# Patient Record
Sex: FEMALE | Race: WHITE
[De-identification: names, ages, dates, MRNs, and addresses within clinical notes are randomized per-mention and may not be internally consistent; named-entity substitution may affect disease eponyms.]

---

## 2017-04-02 ENCOUNTER — HOSPITAL ENCOUNTER (EMERGENCY)
Dept: HOSPITAL 82 - ED | Age: 52
Discharge: HOME | DRG: 605 | End: 2017-04-02
Payer: COMMERCIAL

## 2017-04-02 VITALS — DIASTOLIC BLOOD PRESSURE: 78 MMHG | SYSTOLIC BLOOD PRESSURE: 140 MMHG

## 2017-04-02 VITALS — BODY MASS INDEX: 26.64 KG/M2 | HEIGHT: 63 IN | WEIGHT: 150.36 LBS

## 2017-04-02 DIAGNOSIS — S00.93XA: Primary | ICD-10-CM
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
## 2017-04-02 DIAGNOSIS — S83.91XA: ICD-10-CM
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
## 2017-04-02 DIAGNOSIS — S10.93XA: ICD-10-CM

## 2017-04-11 ENCOUNTER — HOSPITAL ENCOUNTER (EMERGENCY)
Dept: HOSPITAL 82 - ED | Age: 52
Discharge: HOME | DRG: 950 | End: 2017-04-11
Payer: COMMERCIAL

## 2017-04-11 VITALS — WEIGHT: 150.31 LBS | BODY MASS INDEX: 26.63 KG/M2 | HEIGHT: 63 IN

## 2017-04-11 VITALS — DIASTOLIC BLOOD PRESSURE: 92 MMHG | SYSTOLIC BLOOD PRESSURE: 129 MMHG

## 2017-04-11 DIAGNOSIS — S80.01XD: Primary | ICD-10-CM

## 2017-04-11 DIAGNOSIS — S80.11XD: ICD-10-CM

## 2017-04-11 DIAGNOSIS — S83.91XD: ICD-10-CM
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
## 2017-04-11 DIAGNOSIS — M25.461: ICD-10-CM

## 2017-08-22 ENCOUNTER — HOSPITAL ENCOUNTER (EMERGENCY)
Dept: HOSPITAL 82 - ED | Age: 52
Discharge: HOME | DRG: 552 | End: 2017-08-22
Payer: COMMERCIAL

## 2017-08-22 VITALS — WEIGHT: 160.28 LBS | HEIGHT: 63 IN | BODY MASS INDEX: 28.4 KG/M2

## 2017-08-22 VITALS — DIASTOLIC BLOOD PRESSURE: 72 MMHG | SYSTOLIC BLOOD PRESSURE: 138 MMHG

## 2017-08-22 DIAGNOSIS — G35: ICD-10-CM
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
## 2017-08-22 DIAGNOSIS — F31.9: ICD-10-CM

## 2017-08-22 DIAGNOSIS — S13.4XXA: Primary | ICD-10-CM

## 2017-08-22 DIAGNOSIS — F41.9: ICD-10-CM

## 2017-08-22 DIAGNOSIS — J44.9: ICD-10-CM
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
## 2017-08-22 DIAGNOSIS — I10: ICD-10-CM

## 2017-09-12 ENCOUNTER — HOSPITAL ENCOUNTER (INPATIENT)
Dept: HOSPITAL 82 - ED | Age: 52
LOS: 10 days | Discharge: NURSING, REHAB OR LONG-TERM CARE | DRG: 433 | End: 2017-09-22
Attending: INTERNAL MEDICINE | Admitting: INTERNAL MEDICINE
Payer: COMMERCIAL

## 2017-09-12 VITALS — SYSTOLIC BLOOD PRESSURE: 134 MMHG | DIASTOLIC BLOOD PRESSURE: 84 MMHG

## 2017-09-12 VITALS — BODY MASS INDEX: 32.97 KG/M2 | HEIGHT: 63 IN | WEIGHT: 186.06 LBS

## 2017-09-12 VITALS — DIASTOLIC BLOOD PRESSURE: 67 MMHG | SYSTOLIC BLOOD PRESSURE: 111 MMHG

## 2017-09-12 VITALS — DIASTOLIC BLOOD PRESSURE: 76 MMHG | SYSTOLIC BLOOD PRESSURE: 123 MMHG

## 2017-09-12 DIAGNOSIS — N39.0: ICD-10-CM

## 2017-09-12 DIAGNOSIS — K29.70: ICD-10-CM

## 2017-09-12 DIAGNOSIS — Z92.3: ICD-10-CM

## 2017-09-12 DIAGNOSIS — E78.5: ICD-10-CM

## 2017-09-12 DIAGNOSIS — F10.10: ICD-10-CM

## 2017-09-12 DIAGNOSIS — B96.20: ICD-10-CM

## 2017-09-12 DIAGNOSIS — K27.9: ICD-10-CM

## 2017-09-12 DIAGNOSIS — F31.9: ICD-10-CM

## 2017-09-12 DIAGNOSIS — I10: ICD-10-CM

## 2017-09-12 DIAGNOSIS — M79.7: ICD-10-CM

## 2017-09-12 DIAGNOSIS — M19.90: ICD-10-CM

## 2017-09-12 DIAGNOSIS — D05.11: ICD-10-CM

## 2017-09-12 DIAGNOSIS — G62.9: ICD-10-CM

## 2017-09-12 DIAGNOSIS — K21.9: ICD-10-CM

## 2017-09-12 DIAGNOSIS — J44.9: ICD-10-CM

## 2017-09-12 DIAGNOSIS — K70.31: ICD-10-CM

## 2017-09-12 DIAGNOSIS — K70.11: Primary | ICD-10-CM

## 2017-09-12 DIAGNOSIS — Z91.19: ICD-10-CM

## 2017-09-12 LAB
ALBUMIN SERPL-MCNC: 3.5 G/DL (ref 3.2–5)
ALP SERPL-CCNC: 228 U/L (ref 38–126)
ALT SERPL-CCNC: 48 U/L (ref 9–52)
AMYLASE SERPL-CCNC: 49 U/L (ref 30–110)
ANION GAP SERPL CALCULATED.3IONS-SCNC: 20 MMOL/L
APTT PPP: 30.8 SECONDS (ref 20–32.5)
AST SERPL-CCNC: 172 U/L (ref 14–36)
BASOPHILS NFR BLD AUTO: 1 % (ref 0–3)
BUN SERPL-MCNC: 5 MG/DL (ref 7–17)
BUN/CREAT SERPL: 6
CALCIUM SERPL-MCNC: 7.6 MG/DL (ref 8.4–10.2)
CHLORIDE SERPL-SCNC: 95 MMOL/L (ref 95–108)
CO2 SERPL-SCNC: 25 MMOL/L (ref 22–30)
CREAT SERPL-MCNC: 0.8 MG/DL (ref 0.5–1)
EOSINOPHIL NFR BLD AUTO: 1 % (ref 0–8)
ERYTHROCYTE [DISTWIDTH] IN BLOOD BY AUTOMATED COUNT: 24.5 % (ref 11.5–15.5)
GLUCOSE SERPL-MCNC: 90 MG/DL (ref 65–105)
HCT VFR BLD AUTO: 30.5 % (ref 37–47)
HGB BLD-MCNC: 10.3 G/DL (ref 12–16)
IMM GRANULOCYTES NFR BLD: 0.2 % (ref 0–1)
INR PPP: 1.4 RATIO (ref 0.7–1.3)
LIPASE SERPL-CCNC: 124 U/L (ref 23–300)
LYMPHOCYTES NFR BLD: 12 % (ref 15–41)
MCH RBC QN AUTO: 31.4 PG  CALC (ref 26–32)
MCHC RBC AUTO-ENTMCNC: 33.8 G/L CALC (ref 32–36)
MCV RBC AUTO: 93 FL  CALC (ref 80–100)
MONOCYTES NFR BLD AUTO: 9 % (ref 2–13)
MYOGLOBIN SERPL-MCNC: 40 NG/ML (ref 0–62)
NEUTROPHILS # BLD AUTO: 8.02 THOU/UL (ref 2–7.15)
NEUTROPHILS NFR BLD AUTO: 78 % (ref 42–76)
PLATELET # BLD AUTO: 152 THOU/UL (ref 130–400)
POTASSIUM SERPL-SCNC: 2.6 MMOL/L (ref 3.5–5.1)
PROT SERPL-MCNC: 7.6 G/DL (ref 6.3–8.2)
PROTHROMBIN TIME: 15.1 SECONDS (ref 9–12.5)
RBC # BLD AUTO: 3.28 MILL/UL (ref 4.2–5.6)
SODIUM SERPL-SCNC: 137 MMOL/L (ref 137–146)

## 2017-09-12 PROCEDURE — S0164 INJECTION PANTROPRAZOLE: HCPCS

## 2017-09-12 PROCEDURE — P9016 RBC LEUKOCYTES REDUCED: HCPCS

## 2017-09-12 PROCEDURE — 0W9G3ZX DRAINAGE OF PERITONEAL CAVITY, PERCUTANEOUS APPROACH, DIAGNOSTIC: ICD-10-PCS | Performed by: RADIOLOGY

## 2017-09-13 VITALS — DIASTOLIC BLOOD PRESSURE: 85 MMHG | SYSTOLIC BLOOD PRESSURE: 145 MMHG

## 2017-09-13 VITALS — SYSTOLIC BLOOD PRESSURE: 96 MMHG | DIASTOLIC BLOOD PRESSURE: 56 MMHG

## 2017-09-13 VITALS — SYSTOLIC BLOOD PRESSURE: 128 MMHG | DIASTOLIC BLOOD PRESSURE: 84 MMHG

## 2017-09-13 VITALS — DIASTOLIC BLOOD PRESSURE: 75 MMHG | SYSTOLIC BLOOD PRESSURE: 128 MMHG

## 2017-09-13 VITALS — DIASTOLIC BLOOD PRESSURE: 70 MMHG | SYSTOLIC BLOOD PRESSURE: 109 MMHG

## 2017-09-13 LAB
ALBUMIN SERPL-MCNC: 2.9 G/DL (ref 3.2–5)
ALP SERPL-CCNC: 179 U/L (ref 38–126)
ALT SERPL-CCNC: 44 U/L (ref 9–52)
ANION GAP SERPL CALCULATED.3IONS-SCNC: 14 MMOL/L
APTT PPP: 31.4 SECONDS (ref 20–32.5)
AST SERPL-CCNC: 141 U/L (ref 14–36)
BASOPHILS NFR BLD AUTO: 1 % (ref 0–3)
BUN SERPL-MCNC: 6 MG/DL (ref 7–17)
BUN/CREAT SERPL: 8
CALCIUM SERPL-MCNC: 6.7 MG/DL (ref 8.4–10.2)
CHLORIDE SERPL-SCNC: 100 MMOL/L (ref 95–108)
CO2 SERPL-SCNC: 25 MMOL/L (ref 22–30)
CREAT SERPL-MCNC: 0.8 MG/DL (ref 0.5–1)
EOSINOPHIL NFR BLD AUTO: 1 % (ref 0–8)
ERYTHROCYTE [DISTWIDTH] IN BLOOD BY AUTOMATED COUNT: 24.4 % (ref 11.5–15.5)
GLUCOSE SERPL-MCNC: 113 MG/DL (ref 65–105)
HCT VFR BLD AUTO: 27.1 % (ref 37–47)
HGB BLD-MCNC: 9 G/DL (ref 12–16)
IMM GRANULOCYTES NFR BLD: 0.1 % (ref 0–1)
INR PPP: 1.4 RATIO (ref 0.7–1.3)
INR PPP: 1.4 RATIO (ref 0.7–1.3)
LYMPHOCYTES NFR BLD: 20 % (ref 15–41)
MCH RBC QN AUTO: 31.3 PG  CALC (ref 26–32)
MCHC RBC AUTO-ENTMCNC: 33.2 G/L CALC (ref 32–36)
MCV RBC AUTO: 94.1 FL  CALC (ref 80–100)
MONOCYTES NFR BLD AUTO: 11 % (ref 2–13)
NEUTROPHILS # BLD AUTO: 4.75 THOU/UL (ref 2–7.15)
NEUTROPHILS NFR BLD AUTO: 67 % (ref 42–76)
PLATELET # BLD AUTO: 130 THOU/UL (ref 130–400)
POTASSIUM SERPL-SCNC: 3 MMOL/L (ref 3.5–5.1)
PROT SERPL-MCNC: 6.6 G/DL (ref 6.3–8.2)
PROTHROMBIN TIME: 15.1 SECONDS (ref 9–12.5)
PROTHROMBIN TIME: 15.4 SECONDS (ref 9–12.5)
RBC # BLD AUTO: 2.88 MILL/UL (ref 4.2–5.6)
SODIUM SERPL-SCNC: 136 MMOL/L (ref 137–146)

## 2017-09-14 VITALS — SYSTOLIC BLOOD PRESSURE: 136 MMHG | DIASTOLIC BLOOD PRESSURE: 84 MMHG

## 2017-09-14 VITALS — DIASTOLIC BLOOD PRESSURE: 68 MMHG | SYSTOLIC BLOOD PRESSURE: 100 MMHG

## 2017-09-14 VITALS — DIASTOLIC BLOOD PRESSURE: 80 MMHG | SYSTOLIC BLOOD PRESSURE: 114 MMHG

## 2017-09-14 VITALS — SYSTOLIC BLOOD PRESSURE: 138 MMHG | DIASTOLIC BLOOD PRESSURE: 84 MMHG

## 2017-09-14 VITALS — SYSTOLIC BLOOD PRESSURE: 117 MMHG | DIASTOLIC BLOOD PRESSURE: 73 MMHG

## 2017-09-14 VITALS — DIASTOLIC BLOOD PRESSURE: 87 MMHG | SYSTOLIC BLOOD PRESSURE: 129 MMHG

## 2017-09-14 VITALS — SYSTOLIC BLOOD PRESSURE: 106 MMHG | DIASTOLIC BLOOD PRESSURE: 66 MMHG

## 2017-09-14 VITALS — DIASTOLIC BLOOD PRESSURE: 55 MMHG | SYSTOLIC BLOOD PRESSURE: 107 MMHG

## 2017-09-14 VITALS — DIASTOLIC BLOOD PRESSURE: 82 MMHG | SYSTOLIC BLOOD PRESSURE: 135 MMHG

## 2017-09-14 VITALS — DIASTOLIC BLOOD PRESSURE: 69 MMHG | SYSTOLIC BLOOD PRESSURE: 123 MMHG

## 2017-09-14 LAB
ALBUMIN SERPL-MCNC: 2.8 G/DL (ref 3.2–5)
ALP SERPL-CCNC: 186 U/L (ref 38–126)
ALT SERPL-CCNC: 47 U/L (ref 9–52)
ANION GAP SERPL CALCULATED.3IONS-SCNC: 13 MMOL/L
AST SERPL-CCNC: 141 U/L (ref 14–36)
BASOPHILS NFR BLD AUTO: 0 % (ref 0–3)
BUN SERPL-MCNC: 4 MG/DL (ref 7–17)
BUN/CREAT SERPL: 6
CALCIUM SERPL-MCNC: 6.8 MG/DL (ref 8.4–10.2)
CHLORIDE SERPL-SCNC: 103 MMOL/L (ref 95–108)
CO2 SERPL-SCNC: 26 MMOL/L (ref 22–30)
CREAT SERPL-MCNC: 0.6 MG/DL (ref 0.5–1)
EOSINOPHIL NFR BLD AUTO: 1 % (ref 0–8)
ERYTHROCYTE [DISTWIDTH] IN BLOOD BY AUTOMATED COUNT: 24.1 % (ref 11.5–15.5)
GLUCOSE SERPL-MCNC: 102 MG/DL (ref 65–105)
HCT VFR BLD AUTO: 27.3 % (ref 37–47)
HGB BLD-MCNC: 8.9 G/DL (ref 12–16)
IMM GRANULOCYTES NFR BLD: 0.4 % (ref 0–1)
LYMPHOCYTES NFR BLD: 20 % (ref 15–41)
MCH RBC QN AUTO: 30.9 PG  CALC (ref 26–32)
MCHC RBC AUTO-ENTMCNC: 32.6 G/L CALC (ref 32–36)
MCV RBC AUTO: 94.8 FL  CALC (ref 80–100)
MONOCYTES NFR BLD AUTO: 14 % (ref 2–13)
NEUTROPHILS # BLD AUTO: 4.66 THOU/UL (ref 2–7.15)
NEUTROPHILS NFR BLD AUTO: 65 % (ref 42–76)
PLATELET # BLD AUTO: 132 THOU/UL (ref 130–400)
POTASSIUM SERPL-SCNC: 3.9 MMOL/L (ref 3.5–5.1)
PROT SERPL-MCNC: 6.5 G/DL (ref 6.3–8.2)
RBC # BLD AUTO: 2.88 MILL/UL (ref 4.2–5.6)
SODIUM SERPL-SCNC: 138 MMOL/L (ref 137–146)

## 2017-09-14 PROCEDURE — 30233N1 TRANSFUSION OF NONAUTOLOGOUS RED BLOOD CELLS INTO PERIPHERAL VEIN, PERCUTANEOUS APPROACH: ICD-10-PCS | Performed by: RADIOLOGY

## 2017-09-14 PROCEDURE — 30233N1 TRANSFUSION OF NONAUTOLOGOUS RED BLOOD CELLS INTO PERIPHERAL VEIN, PERCUTANEOUS APPROACH: ICD-10-PCS | Performed by: INTERNAL MEDICINE

## 2017-09-15 VITALS — SYSTOLIC BLOOD PRESSURE: 124 MMHG | DIASTOLIC BLOOD PRESSURE: 86 MMHG

## 2017-09-15 VITALS — DIASTOLIC BLOOD PRESSURE: 73 MMHG | SYSTOLIC BLOOD PRESSURE: 122 MMHG

## 2017-09-15 VITALS — SYSTOLIC BLOOD PRESSURE: 127 MMHG | DIASTOLIC BLOOD PRESSURE: 78 MMHG

## 2017-09-15 VITALS — SYSTOLIC BLOOD PRESSURE: 125 MMHG | DIASTOLIC BLOOD PRESSURE: 65 MMHG

## 2017-09-15 LAB
ALBUMIN SERPL-MCNC: 2.8 G/DL (ref 3.2–5)
ALP SERPL-CCNC: 186 U/L (ref 38–126)
ALT SERPL-CCNC: 43 U/L (ref 9–52)
AMORPH SED URNS QL MICRO: (no result) HPF
ANION GAP SERPL CALCULATED.3IONS-SCNC: 14 MMOL/L
AST SERPL-CCNC: 133 U/L (ref 14–36)
BACTERIA #/AREA URNS HPF: (no result) HPF
BASOPHILS NFR BLD AUTO: 1 % (ref 0–3)
BILIRUB UR QL STRIP.AUTO: (no result)
BUN SERPL-MCNC: 4 MG/DL (ref 7–17)
BUN/CREAT SERPL: 6
CALCIUM SERPL-MCNC: 6.8 MG/DL (ref 8.4–10.2)
CHLORIDE SERPL-SCNC: 105 MMOL/L (ref 95–108)
CLARITY UR: (no result)
CO2 SERPL-SCNC: 25 MMOL/L (ref 22–30)
COLOR UR AUTO: YELLOW
CREAT SERPL-MCNC: 0.6 MG/DL (ref 0.5–1)
EOSINOPHIL NFR BLD AUTO: 1 % (ref 0–8)
ERYTHROCYTE [DISTWIDTH] IN BLOOD BY AUTOMATED COUNT: 23.2 % (ref 11.5–15.5)
GLUCOSE SERPL-MCNC: 80 MG/DL (ref 65–105)
GLUCOSE UR STRIP.AUTO-MCNC: 100 MG/DL
HCT VFR BLD AUTO: 35.6 % (ref 37–47)
HGB BLD-MCNC: 12 G/DL (ref 12–16)
HGB UR QL STRIP.AUTO: (no result)
IMM GRANULOCYTES NFR BLD: 0.1 % (ref 0–1)
KETONES UR STRIP.AUTO-MCNC: (no result) MG/DL
LEUKOCYTE ESTERASE UR QL STRIP.AUTO: (no result)
LYMPHOCYTES NFR BLD: 25 % (ref 15–41)
MCH RBC QN AUTO: 30.4 PG  CALC (ref 26–32)
MCHC RBC AUTO-ENTMCNC: 33.7 G/L CALC (ref 32–36)
MCV RBC AUTO: 90.1 FL  CALC (ref 80–100)
MONOCYTES NFR BLD AUTO: 12 % (ref 2–13)
NEUTROPHILS # BLD AUTO: 4.61 THOU/UL (ref 2–7.15)
NEUTROPHILS NFR BLD AUTO: 62 % (ref 42–76)
NITRITE UR QL STRIP.AUTO: POSITIVE
PH UR STRIP.AUTO: 5 [PH] (ref 4.5–8)
PLATELET # BLD AUTO: 140 THOU/UL (ref 130–400)
POTASSIUM SERPL-SCNC: 3.5 MMOL/L (ref 3.5–5.1)
PROT SERPL-MCNC: 6.7 G/DL (ref 6.3–8.2)
PROT UR QL STRIP.AUTO: 100 MG/DL
RBC # BLD AUTO: 3.95 MILL/UL (ref 4.2–5.6)
RBC #/AREA URNS HPF: (no result) RBC/HPF (ref 0–5)
SERVICE CMNT 15-IMP: (no result) HPF
SODIUM SERPL-SCNC: 139 MMOL/L (ref 137–146)
SP GR UR STRIP.AUTO: >=1.03
SQUAMOUS URNS QL MICRO: (no result) EPI/HPF
UROBILINOGEN UR QL STRIP.AUTO: 0.2 E.U./DL

## 2017-09-16 VITALS — SYSTOLIC BLOOD PRESSURE: 129 MMHG | DIASTOLIC BLOOD PRESSURE: 79 MMHG

## 2017-09-16 VITALS — SYSTOLIC BLOOD PRESSURE: 118 MMHG | DIASTOLIC BLOOD PRESSURE: 65 MMHG

## 2017-09-16 VITALS — DIASTOLIC BLOOD PRESSURE: 85 MMHG | SYSTOLIC BLOOD PRESSURE: 120 MMHG

## 2017-09-16 VITALS — DIASTOLIC BLOOD PRESSURE: 78 MMHG | SYSTOLIC BLOOD PRESSURE: 134 MMHG

## 2017-09-16 VITALS — SYSTOLIC BLOOD PRESSURE: 130 MMHG | DIASTOLIC BLOOD PRESSURE: 88 MMHG

## 2017-09-16 VITALS — SYSTOLIC BLOOD PRESSURE: 130 MMHG | DIASTOLIC BLOOD PRESSURE: 82 MMHG

## 2017-09-16 VITALS — SYSTOLIC BLOOD PRESSURE: 121 MMHG | DIASTOLIC BLOOD PRESSURE: 81 MMHG

## 2017-09-16 LAB
ALBUMIN SERPL-MCNC: 3.1 G/DL (ref 3.2–5)
ALP SERPL-CCNC: 195 U/L (ref 38–126)
ALT SERPL-CCNC: 42 U/L (ref 9–52)
ANION GAP SERPL CALCULATED.3IONS-SCNC: 14 MMOL/L
AST SERPL-CCNC: 135 U/L (ref 14–36)
BASOPHILS NFR BLD AUTO: 1 % (ref 0–3)
BUN SERPL-MCNC: 5 MG/DL (ref 7–17)
BUN/CREAT SERPL: 9
CALCIUM SERPL-MCNC: 7.1 MG/DL (ref 8.4–10.2)
CHLORIDE SERPL-SCNC: 103 MMOL/L (ref 95–108)
CO2 SERPL-SCNC: 26 MMOL/L (ref 22–30)
CREAT SERPL-MCNC: 0.6 MG/DL (ref 0.5–1)
EOSINOPHIL NFR BLD AUTO: 1 % (ref 0–8)
ERYTHROCYTE [DISTWIDTH] IN BLOOD BY AUTOMATED COUNT: 23.1 % (ref 11.5–15.5)
GLUCOSE SERPL-MCNC: 84 MG/DL (ref 65–105)
HCT VFR BLD AUTO: 37.1 % (ref 37–47)
HGB BLD-MCNC: 12.5 G/DL (ref 12–16)
IMM GRANULOCYTES NFR BLD: 0.2 % (ref 0–1)
LYMPHOCYTES NFR BLD: 21 % (ref 15–41)
MCH RBC QN AUTO: 30.6 PG  CALC (ref 26–32)
MCHC RBC AUTO-ENTMCNC: 33.7 G/L CALC (ref 32–36)
MCV RBC AUTO: 90.9 FL  CALC (ref 80–100)
MONOCYTES NFR BLD AUTO: 12 % (ref 2–13)
NEUTROPHILS # BLD AUTO: 5.19 THOU/UL (ref 2–7.15)
NEUTROPHILS NFR BLD AUTO: 65 % (ref 42–76)
PLATELET # BLD AUTO: 173 THOU/UL (ref 130–400)
POTASSIUM SERPL-SCNC: 3.7 MMOL/L (ref 3.5–5.1)
PROT SERPL-MCNC: 7.1 G/DL (ref 6.3–8.2)
RBC # BLD AUTO: 4.08 MILL/UL (ref 4.2–5.6)
SODIUM SERPL-SCNC: 139 MMOL/L (ref 137–146)

## 2017-09-17 VITALS — SYSTOLIC BLOOD PRESSURE: 127 MMHG | DIASTOLIC BLOOD PRESSURE: 80 MMHG

## 2017-09-17 VITALS — DIASTOLIC BLOOD PRESSURE: 80 MMHG | SYSTOLIC BLOOD PRESSURE: 168 MMHG

## 2017-09-17 VITALS — DIASTOLIC BLOOD PRESSURE: 71 MMHG | SYSTOLIC BLOOD PRESSURE: 110 MMHG

## 2017-09-17 VITALS — SYSTOLIC BLOOD PRESSURE: 129 MMHG | DIASTOLIC BLOOD PRESSURE: 80 MMHG

## 2017-09-17 VITALS — SYSTOLIC BLOOD PRESSURE: 144 MMHG | DIASTOLIC BLOOD PRESSURE: 80 MMHG

## 2017-09-17 VITALS — DIASTOLIC BLOOD PRESSURE: 62 MMHG | SYSTOLIC BLOOD PRESSURE: 97 MMHG

## 2017-09-17 VITALS — DIASTOLIC BLOOD PRESSURE: 58 MMHG | SYSTOLIC BLOOD PRESSURE: 91 MMHG

## 2017-09-17 LAB
ANION GAP SERPL CALCULATED.3IONS-SCNC: 13 MMOL/L
BASOPHILS NFR BLD AUTO: 1 % (ref 0–3)
BUN SERPL-MCNC: 6 MG/DL (ref 7–17)
BUN/CREAT SERPL: 10
CALCIUM SERPL-MCNC: 6.9 MG/DL (ref 8.4–10.2)
CHLORIDE SERPL-SCNC: 103 MMOL/L (ref 95–108)
CO2 SERPL-SCNC: 25 MMOL/L (ref 22–30)
CREAT SERPL-MCNC: 0.5 MG/DL (ref 0.5–1)
EOSINOPHIL NFR BLD AUTO: 1 % (ref 0–8)
ERYTHROCYTE [DISTWIDTH] IN BLOOD BY AUTOMATED COUNT: 22.4 % (ref 11.5–15.5)
GLUCOSE SERPL-MCNC: 92 MG/DL (ref 65–105)
HCT VFR BLD AUTO: 33.9 % (ref 37–47)
HGB BLD-MCNC: 11.4 G/DL (ref 12–16)
IMM GRANULOCYTES NFR BLD: 0.3 % (ref 0–1)
LYMPHOCYTES NFR BLD: 22 % (ref 15–41)
MCH RBC QN AUTO: 31 PG  CALC (ref 26–32)
MCHC RBC AUTO-ENTMCNC: 33.6 G/L CALC (ref 32–36)
MCV RBC AUTO: 92.1 FL  CALC (ref 80–100)
MONOCYTES NFR BLD AUTO: 12 % (ref 2–13)
NEUTROPHILS # BLD AUTO: 4.94 THOU/UL (ref 2–7.15)
NEUTROPHILS NFR BLD AUTO: 64 % (ref 42–76)
PLATELET # BLD AUTO: 148 THOU/UL (ref 130–400)
POTASSIUM SERPL-SCNC: 3 MMOL/L (ref 3.5–5.1)
RBC # BLD AUTO: 3.68 MILL/UL (ref 4.2–5.6)
SODIUM SERPL-SCNC: 139 MMOL/L (ref 137–146)

## 2017-09-18 VITALS — DIASTOLIC BLOOD PRESSURE: 85 MMHG | SYSTOLIC BLOOD PRESSURE: 121 MMHG

## 2017-09-18 VITALS — SYSTOLIC BLOOD PRESSURE: 109 MMHG | DIASTOLIC BLOOD PRESSURE: 69 MMHG

## 2017-09-18 VITALS — DIASTOLIC BLOOD PRESSURE: 78 MMHG | SYSTOLIC BLOOD PRESSURE: 113 MMHG

## 2017-09-18 VITALS — SYSTOLIC BLOOD PRESSURE: 118 MMHG | DIASTOLIC BLOOD PRESSURE: 75 MMHG

## 2017-09-18 VITALS — SYSTOLIC BLOOD PRESSURE: 109 MMHG | DIASTOLIC BLOOD PRESSURE: 67 MMHG

## 2017-09-18 VITALS — SYSTOLIC BLOOD PRESSURE: 128 MMHG | DIASTOLIC BLOOD PRESSURE: 80 MMHG

## 2017-09-18 LAB
ALBUMIN SERPL-MCNC: 2.6 G/DL (ref 3.2–5)
ALP SERPL-CCNC: 153 U/L (ref 38–126)
ALT SERPL-CCNC: 38 U/L (ref 9–52)
ANION GAP SERPL CALCULATED.3IONS-SCNC: 11 MMOL/L
AST SERPL-CCNC: 101 U/L (ref 14–36)
BASOPHILS NFR BLD AUTO: 1 % (ref 0–3)
BUN SERPL-MCNC: 6 MG/DL (ref 7–17)
BUN/CREAT SERPL: 12
CALCIUM SERPL-MCNC: 7.3 MG/DL (ref 8.4–10.2)
CHLORIDE SERPL-SCNC: 105 MMOL/L (ref 95–108)
CO2 SERPL-SCNC: 25 MMOL/L (ref 22–30)
CREAT SERPL-MCNC: 0.5 MG/DL (ref 0.5–1)
EOSINOPHIL NFR BLD AUTO: 1 % (ref 0–8)
ERYTHROCYTE [DISTWIDTH] IN BLOOD BY AUTOMATED COUNT: 21.5 % (ref 11.5–15.5)
GLUCOSE SERPL-MCNC: 92 MG/DL (ref 65–105)
HCT VFR BLD AUTO: 34.3 % (ref 37–47)
HGB BLD-MCNC: 11.5 G/DL (ref 12–16)
IMM GRANULOCYTES NFR BLD: 0.4 % (ref 0–1)
LYMPHOCYTES NFR BLD: 23 % (ref 15–41)
MCH RBC QN AUTO: 31 PG  CALC (ref 26–32)
MCHC RBC AUTO-ENTMCNC: 33.5 G/L CALC (ref 32–36)
MCV RBC AUTO: 92.5 FL  CALC (ref 80–100)
MONOCYTES NFR BLD AUTO: 12 % (ref 2–13)
NEUTROPHILS # BLD AUTO: 5.14 THOU/UL (ref 2–7.15)
NEUTROPHILS NFR BLD AUTO: 63 % (ref 42–76)
PLATELET # BLD AUTO: 156 THOU/UL (ref 130–400)
POTASSIUM SERPL-SCNC: 3.6 MMOL/L (ref 3.5–5.1)
PROT SERPL-MCNC: 6.3 G/DL (ref 6.3–8.2)
RBC # BLD AUTO: 3.71 MILL/UL (ref 4.2–5.6)
SODIUM SERPL-SCNC: 138 MMOL/L (ref 137–146)

## 2017-09-18 PROCEDURE — 02HV33Z INSERTION OF INFUSION DEVICE INTO SUPERIOR VENA CAVA, PERCUTANEOUS APPROACH: ICD-10-PCS | Performed by: RADIOLOGY

## 2017-09-18 PROCEDURE — B518ZZA FLUOROSCOPY OF SUPERIOR VENA CAVA, GUIDANCE: ICD-10-PCS | Performed by: RADIOLOGY

## 2017-09-19 VITALS — SYSTOLIC BLOOD PRESSURE: 122 MMHG | DIASTOLIC BLOOD PRESSURE: 89 MMHG

## 2017-09-19 VITALS — SYSTOLIC BLOOD PRESSURE: 147 MMHG | DIASTOLIC BLOOD PRESSURE: 80 MMHG

## 2017-09-19 VITALS — DIASTOLIC BLOOD PRESSURE: 70 MMHG | SYSTOLIC BLOOD PRESSURE: 146 MMHG

## 2017-09-19 VITALS — SYSTOLIC BLOOD PRESSURE: 123 MMHG | DIASTOLIC BLOOD PRESSURE: 80 MMHG

## 2017-09-19 VITALS — SYSTOLIC BLOOD PRESSURE: 119 MMHG | DIASTOLIC BLOOD PRESSURE: 83 MMHG

## 2017-09-19 VITALS — SYSTOLIC BLOOD PRESSURE: 123 MMHG | DIASTOLIC BLOOD PRESSURE: 77 MMHG

## 2017-09-20 VITALS — SYSTOLIC BLOOD PRESSURE: 138 MMHG | DIASTOLIC BLOOD PRESSURE: 82 MMHG

## 2017-09-20 VITALS — DIASTOLIC BLOOD PRESSURE: 75 MMHG | SYSTOLIC BLOOD PRESSURE: 119 MMHG

## 2017-09-20 VITALS — SYSTOLIC BLOOD PRESSURE: 124 MMHG | DIASTOLIC BLOOD PRESSURE: 74 MMHG

## 2017-09-20 VITALS — DIASTOLIC BLOOD PRESSURE: 81 MMHG | SYSTOLIC BLOOD PRESSURE: 176 MMHG

## 2017-09-20 VITALS — SYSTOLIC BLOOD PRESSURE: 141 MMHG | DIASTOLIC BLOOD PRESSURE: 71 MMHG

## 2017-09-20 VITALS — SYSTOLIC BLOOD PRESSURE: 123 MMHG | DIASTOLIC BLOOD PRESSURE: 77 MMHG

## 2017-09-21 VITALS — DIASTOLIC BLOOD PRESSURE: 82 MMHG | SYSTOLIC BLOOD PRESSURE: 139 MMHG

## 2017-09-21 VITALS — DIASTOLIC BLOOD PRESSURE: 97 MMHG | SYSTOLIC BLOOD PRESSURE: 152 MMHG

## 2017-09-21 VITALS — SYSTOLIC BLOOD PRESSURE: 105 MMHG | DIASTOLIC BLOOD PRESSURE: 70 MMHG

## 2017-09-21 VITALS — DIASTOLIC BLOOD PRESSURE: 65 MMHG | SYSTOLIC BLOOD PRESSURE: 109 MMHG

## 2017-09-21 VITALS — SYSTOLIC BLOOD PRESSURE: 134 MMHG | DIASTOLIC BLOOD PRESSURE: 88 MMHG

## 2017-09-21 LAB
ANION GAP SERPL CALCULATED.3IONS-SCNC: 15 MMOL/L
BASOPHILS NFR BLD AUTO: 1 % (ref 0–3)
BUN SERPL-MCNC: 6 MG/DL (ref 7–17)
BUN/CREAT SERPL: 11
CALCIUM SERPL-MCNC: 8.3 MG/DL (ref 8.4–10.2)
CHLORIDE SERPL-SCNC: 103 MMOL/L (ref 95–108)
CO2 SERPL-SCNC: 23 MMOL/L (ref 22–30)
CREAT SERPL-MCNC: 0.6 MG/DL (ref 0.5–1)
EOSINOPHIL NFR BLD AUTO: 1 % (ref 0–8)
ERYTHROCYTE [DISTWIDTH] IN BLOOD BY AUTOMATED COUNT: 20.8 % (ref 11.5–15.5)
GLUCOSE SERPL-MCNC: 84 MG/DL (ref 65–105)
HCT VFR BLD AUTO: 36 % (ref 37–47)
HGB BLD-MCNC: 12.2 G/DL (ref 12–16)
IMM GRANULOCYTES NFR BLD: 0.3 % (ref 0–1)
LYMPHOCYTES NFR BLD: 23 % (ref 15–41)
MCH RBC QN AUTO: 31.1 PG  CALC (ref 26–32)
MCHC RBC AUTO-ENTMCNC: 33.9 G/L CALC (ref 32–36)
MCV RBC AUTO: 91.8 FL  CALC (ref 80–100)
MONOCYTES NFR BLD AUTO: 9 % (ref 2–13)
NEUTROPHILS # BLD AUTO: 7.59 THOU/UL (ref 2–7.15)
NEUTROPHILS NFR BLD AUTO: 66 % (ref 42–76)
PLATELET # BLD AUTO: 166 THOU/UL (ref 130–400)
POTASSIUM SERPL-SCNC: 3.8 MMOL/L (ref 3.5–5.1)
RBC # BLD AUTO: 3.92 MILL/UL (ref 4.2–5.6)
SODIUM SERPL-SCNC: 138 MMOL/L (ref 137–146)

## 2017-09-22 VITALS — DIASTOLIC BLOOD PRESSURE: 77 MMHG | SYSTOLIC BLOOD PRESSURE: 111 MMHG

## 2017-09-22 VITALS — DIASTOLIC BLOOD PRESSURE: 85 MMHG | SYSTOLIC BLOOD PRESSURE: 120 MMHG

## 2017-09-22 VITALS — SYSTOLIC BLOOD PRESSURE: 119 MMHG | DIASTOLIC BLOOD PRESSURE: 82 MMHG

## 2017-09-24 ENCOUNTER — HOSPITAL ENCOUNTER (INPATIENT)
Dept: HOSPITAL 82 - ED | Age: 52
LOS: 3 days | Discharge: HOME | DRG: 434 | End: 2017-09-27
Attending: INTERNAL MEDICINE | Admitting: INTERNAL MEDICINE
Payer: COMMERCIAL

## 2017-09-24 VITALS — WEIGHT: 178.56 LBS | BODY MASS INDEX: 31.64 KG/M2 | HEIGHT: 63 IN

## 2017-09-24 VITALS — SYSTOLIC BLOOD PRESSURE: 129 MMHG | DIASTOLIC BLOOD PRESSURE: 83 MMHG

## 2017-09-24 VITALS — SYSTOLIC BLOOD PRESSURE: 134 MMHG | DIASTOLIC BLOOD PRESSURE: 91 MMHG

## 2017-09-24 DIAGNOSIS — E87.6: ICD-10-CM

## 2017-09-24 DIAGNOSIS — E03.9: ICD-10-CM

## 2017-09-24 DIAGNOSIS — K70.31: Primary | ICD-10-CM

## 2017-09-24 DIAGNOSIS — E78.5: ICD-10-CM

## 2017-09-24 DIAGNOSIS — I10: ICD-10-CM

## 2017-09-24 DIAGNOSIS — D05.11: ICD-10-CM

## 2017-09-24 DIAGNOSIS — F31.9: ICD-10-CM

## 2017-09-24 DIAGNOSIS — K70.11: ICD-10-CM

## 2017-09-24 DIAGNOSIS — E86.0: ICD-10-CM

## 2017-09-24 DIAGNOSIS — M19.90: ICD-10-CM

## 2017-09-24 DIAGNOSIS — G62.9: ICD-10-CM

## 2017-09-24 DIAGNOSIS — J44.9: ICD-10-CM

## 2017-09-24 DIAGNOSIS — F10.20: ICD-10-CM

## 2017-09-24 DIAGNOSIS — K29.70: ICD-10-CM

## 2017-09-24 DIAGNOSIS — K27.9: ICD-10-CM

## 2017-09-24 DIAGNOSIS — K21.9: ICD-10-CM

## 2017-09-24 DIAGNOSIS — F41.9: ICD-10-CM

## 2017-09-24 LAB
ALBUMIN SERPL-MCNC: 3.1 G/DL (ref 3.2–5)
ALP SERPL-CCNC: 180 U/L (ref 38–126)
ALT SERPL-CCNC: 40 U/L (ref 9–52)
AMYLASE SERPL-CCNC: 51 U/L (ref 30–110)
ANION GAP SERPL CALCULATED.3IONS-SCNC: 16 MMOL/L
AST SERPL-CCNC: 145 U/L (ref 14–36)
BACTERIA #/AREA URNS HPF: (no result) HPF
BASOPHILS NFR BLD AUTO: 1 % (ref 0–3)
BILIRUB UR QL STRIP.AUTO: (no result)
BUN SERPL-MCNC: 5 MG/DL (ref 7–17)
BUN/CREAT SERPL: 9
CALCIUM SERPL-MCNC: 8.4 MG/DL (ref 8.4–10.2)
CHLORIDE SERPL-SCNC: 104 MMOL/L (ref 95–108)
CLARITY UR: CLEAR
CO2 SERPL-SCNC: 25 MMOL/L (ref 22–30)
COLOR UR AUTO: (no result)
CREAT SERPL-MCNC: 0.6 MG/DL (ref 0.5–1)
EOSINOPHIL NFR BLD AUTO: 1 % (ref 0–8)
ERYTHROCYTE [DISTWIDTH] IN BLOOD BY AUTOMATED COUNT: 20.5 % (ref 11.5–15.5)
GLUCOSE SERPL-MCNC: 111 MG/DL (ref 65–105)
GLUCOSE UR STRIP.AUTO-MCNC: NEGATIVE MG/DL
HCT VFR BLD AUTO: 39 % (ref 37–47)
HGB BLD-MCNC: 12.9 G/DL (ref 12–16)
HGB UR QL STRIP.AUTO: (no result)
IMM GRANULOCYTES NFR BLD: 0.5 % (ref 0–1)
INR PPP: 1.4 RATIO (ref 0.7–1.3)
KETONES UR STRIP.AUTO-MCNC: (no result) MG/DL
LEUKOCYTE ESTERASE UR QL STRIP.AUTO: NEGATIVE
LIPASE SERPL-CCNC: 95 U/L (ref 23–300)
LYMPHOCYTES NFR BLD: 16 % (ref 15–41)
MCH RBC QN AUTO: 31.1 PG  CALC (ref 26–32)
MCHC RBC AUTO-ENTMCNC: 33.1 G/L CALC (ref 32–36)
MCV RBC AUTO: 94 FL  CALC (ref 80–100)
MONOCYTES NFR BLD AUTO: 6 % (ref 2–13)
MYOGLOBIN SERPL-MCNC: 14 NG/ML (ref 0–62)
NEUTROPHILS # BLD AUTO: 10.1 THOU/UL (ref 2–7.15)
NEUTROPHILS NFR BLD AUTO: 76 % (ref 42–76)
NITRITE UR QL STRIP.AUTO: POSITIVE
PH UR STRIP.AUTO: 5 [PH] (ref 4.5–8)
PLATELET # BLD AUTO: 166 THOU/UL (ref 130–400)
POTASSIUM SERPL-SCNC: 3.3 MMOL/L (ref 3.5–5.1)
PROT SERPL-MCNC: 7.5 G/DL (ref 6.3–8.2)
PROT UR QL STRIP.AUTO: 30 MG/DL
PROTHROMBIN TIME: 15.8 SECONDS (ref 9–12.5)
RBC # BLD AUTO: 4.15 MILL/UL (ref 4.2–5.6)
RBC #/AREA URNS HPF: (no result) RBC/HPF (ref 0–5)
SODIUM SERPL-SCNC: 141 MMOL/L (ref 137–146)
SP GR UR STRIP.AUTO: 1.02
SQUAMOUS URNS QL MICRO: (no result) EPI/HPF
UROBILINOGEN UR QL STRIP.AUTO: 1 E.U./DL

## 2017-09-25 VITALS — DIASTOLIC BLOOD PRESSURE: 77 MMHG | SYSTOLIC BLOOD PRESSURE: 120 MMHG

## 2017-09-25 VITALS — SYSTOLIC BLOOD PRESSURE: 119 MMHG | DIASTOLIC BLOOD PRESSURE: 67 MMHG

## 2017-09-25 VITALS — SYSTOLIC BLOOD PRESSURE: 111 MMHG | DIASTOLIC BLOOD PRESSURE: 75 MMHG

## 2017-09-25 VITALS — DIASTOLIC BLOOD PRESSURE: 79 MMHG | SYSTOLIC BLOOD PRESSURE: 122 MMHG

## 2017-09-25 VITALS — DIASTOLIC BLOOD PRESSURE: 76 MMHG | SYSTOLIC BLOOD PRESSURE: 122 MMHG

## 2017-09-25 LAB
ALBUMIN SERPL-MCNC: 2.9 G/DL (ref 3.2–5)
ALP SERPL-CCNC: 162 U/L (ref 38–126)
ALT SERPL-CCNC: 37 U/L (ref 9–52)
ANION GAP SERPL CALCULATED.3IONS-SCNC: 15 MMOL/L
APTT PPP: 32 SECONDS (ref 20–32.5)
AST SERPL-CCNC: 128 U/L (ref 14–36)
BASOPHILS NFR BLD AUTO: 1 % (ref 0–3)
BUN SERPL-MCNC: 6 MG/DL (ref 7–17)
BUN/CREAT SERPL: 11
CALCIUM SERPL-MCNC: 8.3 MG/DL (ref 8.4–10.2)
CHLORIDE SERPL-SCNC: 106 MMOL/L (ref 95–108)
CO2 SERPL-SCNC: 25 MMOL/L (ref 22–30)
CREAT SERPL-MCNC: 0.5 MG/DL (ref 0.5–1)
EOSINOPHIL NFR BLD AUTO: 1 % (ref 0–8)
ERYTHROCYTE [DISTWIDTH] IN BLOOD BY AUTOMATED COUNT: 20.4 % (ref 11.5–15.5)
GLUCOSE SERPL-MCNC: 84 MG/DL (ref 65–105)
HCT VFR BLD AUTO: 35.9 % (ref 37–47)
HGB BLD-MCNC: 11.9 G/DL (ref 12–16)
IMM GRANULOCYTES NFR BLD: 0.9 % (ref 0–1)
INR PPP: 1.4 RATIO (ref 0.7–1.3)
LYMPHOCYTES NFR BLD: 18 % (ref 15–41)
MCH RBC QN AUTO: 31.3 PG  CALC (ref 26–32)
MCHC RBC AUTO-ENTMCNC: 33.1 G/L CALC (ref 32–36)
MCV RBC AUTO: 94.5 FL  CALC (ref 80–100)
MONOCYTES NFR BLD AUTO: 7 % (ref 2–13)
NEUTROPHILS # BLD AUTO: 9.9 THOU/UL (ref 2–7.15)
NEUTROPHILS NFR BLD AUTO: 72 % (ref 42–76)
PLATELET # BLD AUTO: 175 THOU/UL (ref 130–400)
POTASSIUM SERPL-SCNC: 3.6 MMOL/L (ref 3.5–5.1)
PROT SERPL-MCNC: 6.9 G/DL (ref 6.3–8.2)
PROTHROMBIN TIME: 15.3 SECONDS (ref 9–12.5)
RBC # BLD AUTO: 3.8 MILL/UL (ref 4.2–5.6)
SODIUM SERPL-SCNC: 143 MMOL/L (ref 137–146)

## 2017-09-25 PROCEDURE — 0W9G3ZZ DRAINAGE OF PERITONEAL CAVITY, PERCUTANEOUS APPROACH: ICD-10-PCS | Performed by: RADIOLOGY

## 2017-09-26 VITALS — SYSTOLIC BLOOD PRESSURE: 114 MMHG | DIASTOLIC BLOOD PRESSURE: 69 MMHG

## 2017-09-26 VITALS — SYSTOLIC BLOOD PRESSURE: 128 MMHG | DIASTOLIC BLOOD PRESSURE: 68 MMHG

## 2017-09-26 VITALS — DIASTOLIC BLOOD PRESSURE: 87 MMHG | SYSTOLIC BLOOD PRESSURE: 127 MMHG

## 2017-09-26 VITALS — DIASTOLIC BLOOD PRESSURE: 78 MMHG | SYSTOLIC BLOOD PRESSURE: 133 MMHG

## 2017-09-26 LAB
ALBUMIN SERPL-MCNC: 2.5 G/DL (ref 3.2–5)
ALP SERPL-CCNC: 148 U/L (ref 38–126)
ALT SERPL-CCNC: 33 U/L (ref 9–52)
ANION GAP SERPL CALCULATED.3IONS-SCNC: 11 MMOL/L
ANION GAP SERPL CALCULATED.3IONS-SCNC: 16 MMOL/L
AST SERPL-CCNC: 114 U/L (ref 14–36)
BASOPHILS NFR BLD AUTO: 1 % (ref 0–3)
BASOPHILS NFR BLD AUTO: 1 % (ref 0–3)
BUN SERPL-MCNC: 7 MG/DL (ref 7–17)
BUN SERPL-MCNC: 8 MG/DL (ref 7–17)
BUN/CREAT SERPL: 11
BUN/CREAT SERPL: 12
CALCIUM SERPL-MCNC: 8 MG/DL (ref 8.4–10.2)
CALCIUM SERPL-MCNC: 8.2 MG/DL (ref 8.4–10.2)
CHLORIDE SERPL-SCNC: 105 MMOL/L (ref 95–108)
CHLORIDE SERPL-SCNC: 108 MMOL/L (ref 95–108)
CO2 SERPL-SCNC: 22 MMOL/L (ref 22–30)
CO2 SERPL-SCNC: 22 MMOL/L (ref 22–30)
CREAT SERPL-MCNC: 0.6 MG/DL (ref 0.5–1)
CREAT SERPL-MCNC: 0.6 MG/DL (ref 0.5–1)
EOSINOPHIL NFR BLD AUTO: 1 % (ref 0–8)
EOSINOPHIL NFR BLD AUTO: 1 % (ref 0–8)
ERYTHROCYTE [DISTWIDTH] IN BLOOD BY AUTOMATED COUNT: 19.9 % (ref 11.5–15.5)
ERYTHROCYTE [DISTWIDTH] IN BLOOD BY AUTOMATED COUNT: 20.1 % (ref 11.5–15.5)
GLUCOSE SERPL-MCNC: 107 MG/DL (ref 65–105)
GLUCOSE SERPL-MCNC: 96 MG/DL (ref 65–105)
HCT VFR BLD AUTO: 34.7 % (ref 37–47)
HCT VFR BLD AUTO: 37.9 % (ref 37–47)
HGB BLD-MCNC: 11.4 G/DL (ref 12–16)
HGB BLD-MCNC: 12.4 G/DL (ref 12–16)
IMM GRANULOCYTES NFR BLD: 0.4 % (ref 0–1)
IMM GRANULOCYTES NFR BLD: 0.4 % (ref 0–1)
LYMPHOCYTES NFR BLD: 16 % (ref 15–41)
LYMPHOCYTES NFR BLD: 18 % (ref 15–41)
MCH RBC QN AUTO: 31.1 PG  CALC (ref 26–32)
MCH RBC QN AUTO: 31.2 PG  CALC (ref 26–32)
MCHC RBC AUTO-ENTMCNC: 32.7 G/L CALC (ref 32–36)
MCHC RBC AUTO-ENTMCNC: 32.9 G/L CALC (ref 32–36)
MCV RBC AUTO: 94.8 FL  CALC (ref 80–100)
MCV RBC AUTO: 95.5 FL  CALC (ref 80–100)
MONOCYTES NFR BLD AUTO: 6 % (ref 2–13)
MONOCYTES NFR BLD AUTO: 7 % (ref 2–13)
NEUTROPHILS # BLD AUTO: 12.52 THOU/UL (ref 2–7.15)
NEUTROPHILS # BLD AUTO: 9.9 THOU/UL (ref 2–7.15)
NEUTROPHILS NFR BLD AUTO: 73 % (ref 42–76)
NEUTROPHILS NFR BLD AUTO: 76 % (ref 42–76)
PLATELET # BLD AUTO: 167 THOU/UL (ref 130–400)
PLATELET # BLD AUTO: 203 THOU/UL (ref 130–400)
POTASSIUM SERPL-SCNC: 4.1 MMOL/L (ref 3.5–5.1)
POTASSIUM SERPL-SCNC: 4.3 MMOL/L (ref 3.5–5.1)
PROT SERPL-MCNC: 6.3 G/DL (ref 6.3–8.2)
RBC # BLD AUTO: 3.66 MILL/UL (ref 4.2–5.6)
RBC # BLD AUTO: 3.97 MILL/UL (ref 4.2–5.6)
SODIUM SERPL-SCNC: 138 MMOL/L (ref 137–146)
SODIUM SERPL-SCNC: 139 MMOL/L (ref 137–146)

## 2017-09-27 VITALS — SYSTOLIC BLOOD PRESSURE: 136 MMHG | DIASTOLIC BLOOD PRESSURE: 95 MMHG

## 2017-09-27 VITALS — SYSTOLIC BLOOD PRESSURE: 122 MMHG | DIASTOLIC BLOOD PRESSURE: 77 MMHG

## 2017-10-19 ENCOUNTER — HOSPITAL ENCOUNTER (EMERGENCY)
Dept: HOSPITAL 82 - ED | Age: 52
Discharge: HOME | DRG: 434 | End: 2017-10-19
Payer: COMMERCIAL

## 2017-10-19 VITALS — WEIGHT: 182.32 LBS | BODY MASS INDEX: 32.3 KG/M2 | HEIGHT: 63 IN

## 2017-10-19 VITALS — DIASTOLIC BLOOD PRESSURE: 79 MMHG | SYSTOLIC BLOOD PRESSURE: 164 MMHG

## 2017-10-19 DIAGNOSIS — G35: ICD-10-CM

## 2017-10-19 DIAGNOSIS — F41.9: ICD-10-CM

## 2017-10-19 DIAGNOSIS — R10.31: ICD-10-CM

## 2017-10-19 DIAGNOSIS — K70.31: Primary | ICD-10-CM

## 2017-10-19 DIAGNOSIS — R10.11: ICD-10-CM

## 2017-10-19 DIAGNOSIS — F43.10: ICD-10-CM

## 2017-10-19 DIAGNOSIS — I10: ICD-10-CM

## 2017-10-19 DIAGNOSIS — F31.9: ICD-10-CM

## 2017-10-19 DIAGNOSIS — J44.9: ICD-10-CM

## 2017-10-19 LAB
ALBUMIN SERPL-MCNC: 2.9 G/DL (ref 3.2–5)
ALP SERPL-CCNC: 175 U/L (ref 38–126)
ALT SERPL-CCNC: 31 U/L (ref 9–52)
AMYLASE SERPL-CCNC: 76 U/L (ref 30–110)
ANION GAP SERPL CALCULATED.3IONS-SCNC: 13 MMOL/L
AST SERPL-CCNC: 147 U/L (ref 14–36)
BASOPHILS NFR BLD AUTO: 1 % (ref 0–3)
BUN SERPL-MCNC: 11 MG/DL (ref 7–17)
BUN/CREAT SERPL: 15
CALCIUM SERPL-MCNC: 8.5 MG/DL (ref 8.4–10.2)
CHLORIDE SERPL-SCNC: 102 MMOL/L (ref 95–108)
CO2 SERPL-SCNC: 24 MMOL/L (ref 22–30)
CREAT SERPL-MCNC: 0.8 MG/DL (ref 0.5–1)
EOSINOPHIL NFR BLD AUTO: 1 % (ref 0–8)
ERYTHROCYTE [DISTWIDTH] IN BLOOD BY AUTOMATED COUNT: 17.5 % (ref 11.5–15.5)
GLUCOSE SERPL-MCNC: 98 MG/DL (ref 65–105)
HCT VFR BLD AUTO: 35.9 % (ref 37–47)
HGB BLD-MCNC: 12.3 G/DL (ref 12–16)
IMM GRANULOCYTES NFR BLD: 0.2 % (ref 0–1)
LIPASE SERPL-CCNC: 278 U/L (ref 23–300)
LYMPHOCYTES NFR BLD: 25 % (ref 15–41)
MCH RBC QN AUTO: 30.5 PG  CALC (ref 26–32)
MCHC RBC AUTO-ENTMCNC: 34.3 G/L CALC (ref 32–36)
MCV RBC AUTO: 89.1 FL  CALC (ref 80–100)
MONOCYTES NFR BLD AUTO: 7 % (ref 2–13)
NEUTROPHILS # BLD AUTO: 8.86 THOU/UL (ref 2–7.15)
NEUTROPHILS NFR BLD AUTO: 67 % (ref 42–76)
PLATELET # BLD AUTO: 168 THOU/UL (ref 130–400)
POTASSIUM SERPL-SCNC: 3.7 MMOL/L (ref 3.5–5.1)
PROT SERPL-MCNC: 7.2 G/DL (ref 6.3–8.2)
RBC # BLD AUTO: 4.03 MILL/UL (ref 4.2–5.6)
SODIUM SERPL-SCNC: 135 MMOL/L (ref 137–146)

## 2017-10-19 PROCEDURE — S0164 INJECTION PANTROPRAZOLE: HCPCS

## 2017-10-19 PROCEDURE — P9047 ALBUMIN (HUMAN), 25%, 50ML: HCPCS

## 2017-10-28 ENCOUNTER — HOSPITAL ENCOUNTER (EMERGENCY)
Dept: HOSPITAL 82 - ED | Age: 52
Discharge: HOME | DRG: 433 | End: 2017-10-28
Payer: COMMERCIAL

## 2017-10-28 VITALS — BODY MASS INDEX: 31.33 KG/M2 | WEIGHT: 176.81 LBS | HEIGHT: 63 IN

## 2017-10-28 VITALS — SYSTOLIC BLOOD PRESSURE: 126 MMHG | DIASTOLIC BLOOD PRESSURE: 63 MMHG

## 2017-10-28 DIAGNOSIS — R18.8: ICD-10-CM

## 2017-10-28 DIAGNOSIS — F41.9: ICD-10-CM

## 2017-10-28 DIAGNOSIS — N39.0: ICD-10-CM

## 2017-10-28 DIAGNOSIS — J44.9: ICD-10-CM

## 2017-10-28 DIAGNOSIS — I10: ICD-10-CM

## 2017-10-28 DIAGNOSIS — K74.60: Primary | ICD-10-CM

## 2017-10-28 DIAGNOSIS — Z85.3: ICD-10-CM

## 2017-10-28 DIAGNOSIS — R14.0: ICD-10-CM

## 2017-10-28 DIAGNOSIS — B96.20: ICD-10-CM

## 2017-10-28 DIAGNOSIS — F31.9: ICD-10-CM

## 2017-10-28 LAB
ALBUMIN SERPL-MCNC: 3 G/DL (ref 3.2–5)
ALP SERPL-CCNC: 165 U/L (ref 38–126)
ALT SERPL-CCNC: 32 U/L (ref 9–52)
AMYLASE SERPL-CCNC: 82 U/L (ref 30–110)
ANION GAP SERPL CALCULATED.3IONS-SCNC: 11 MMOL/L
AST SERPL-CCNC: 142 U/L (ref 14–36)
BASOPHILS NFR BLD AUTO: 1 % (ref 0–3)
BILIRUB UR QL STRIP.AUTO: (no result)
BUN SERPL-MCNC: 9 MG/DL (ref 7–17)
BUN/CREAT SERPL: 13
CALCIUM SERPL-MCNC: 8.5 MG/DL (ref 8.4–10.2)
CHLORIDE SERPL-SCNC: 102 MMOL/L (ref 95–108)
CLARITY UR: (no result)
CO2 SERPL-SCNC: 28 MMOL/L (ref 22–30)
COLOR UR AUTO: (no result)
CREAT SERPL-MCNC: 0.7 MG/DL (ref 0.5–1)
EOSINOPHIL NFR BLD AUTO: 1 % (ref 0–8)
ERYTHROCYTE [DISTWIDTH] IN BLOOD BY AUTOMATED COUNT: 16.5 % (ref 11.5–15.5)
GLUCOSE SERPL-MCNC: 101 MG/DL (ref 65–105)
GLUCOSE UR STRIP.AUTO-MCNC: 100 MG/DL
HCT VFR BLD AUTO: 37.3 % (ref 37–47)
HGB BLD-MCNC: 12.4 G/DL (ref 12–16)
HGB UR QL STRIP.AUTO: (no result)
IMM GRANULOCYTES NFR BLD: 0.4 % (ref 0–1)
INR PPP: 1.4 RATIO (ref 0.7–1.3)
KETONES UR STRIP.AUTO-MCNC: (no result) MG/DL
LEUKOCYTE ESTERASE UR QL STRIP.AUTO: (no result)
LIPASE SERPL-CCNC: 163 U/L (ref 23–300)
LYMPHOCYTES NFR BLD: 31 % (ref 15–41)
MCH RBC QN AUTO: 30.2 PG  CALC (ref 26–32)
MCHC RBC AUTO-ENTMCNC: 33.2 G/L CALC (ref 32–36)
MCV RBC AUTO: 91 FL  CALC (ref 80–100)
MONOCYTES NFR BLD AUTO: 7 % (ref 2–13)
NEUTROPHILS # BLD AUTO: 7.32 THOU/UL (ref 2–7.15)
NEUTROPHILS NFR BLD AUTO: 60 % (ref 42–76)
NITRITE UR QL STRIP.AUTO: POSITIVE
PH UR STRIP.AUTO: 5.5 [PH] (ref 4.5–8)
PLATELET # BLD AUTO: 161 THOU/UL (ref 130–400)
POTASSIUM SERPL-SCNC: 3.5 MMOL/L (ref 3.5–5.1)
PROT SERPL-MCNC: 7.7 G/DL (ref 6.3–8.2)
PROT UR QL STRIP.AUTO: 100 MG/DL
PROTHROMBIN TIME: 15.4 SECONDS (ref 9–12.5)
RBC # BLD AUTO: 4.1 MILL/UL (ref 4.2–5.6)
RBC #/AREA URNS HPF: (no result) RBC/HPF (ref 0–5)
SODIUM SERPL-SCNC: 136 MMOL/L (ref 137–146)
SP GR UR STRIP.AUTO: 1.02
SQUAMOUS URNS QL MICRO: (no result) EPI/HPF
UROBILINOGEN UR QL STRIP.AUTO: 2 E.U./DL
WBC #/AREA URNS HPF: >100 WBC/HPF (ref 0–5)

## 2017-11-22 ENCOUNTER — HOSPITAL ENCOUNTER (EMERGENCY)
Dept: HOSPITAL 82 - ED | Age: 52
Discharge: HOME | DRG: 433 | End: 2017-11-22
Payer: COMMERCIAL

## 2017-11-22 VITALS — WEIGHT: 176.37 LBS | HEIGHT: 63 IN | BODY MASS INDEX: 31.25 KG/M2

## 2017-11-22 VITALS — DIASTOLIC BLOOD PRESSURE: 68 MMHG | SYSTOLIC BLOOD PRESSURE: 126 MMHG

## 2017-11-22 DIAGNOSIS — J44.9: ICD-10-CM

## 2017-11-22 DIAGNOSIS — F41.9: ICD-10-CM

## 2017-11-22 DIAGNOSIS — K74.60: Primary | ICD-10-CM

## 2017-11-22 DIAGNOSIS — R18.8: ICD-10-CM

## 2017-11-22 DIAGNOSIS — F31.9: ICD-10-CM

## 2017-11-22 DIAGNOSIS — I10: ICD-10-CM

## 2017-11-22 LAB
ALBUMIN SERPL-MCNC: 3.1 G/DL (ref 3.2–5)
ALP SERPL-CCNC: 156 U/L (ref 38–126)
ALT SERPL-CCNC: 32 U/L (ref 9–52)
AMYLASE SERPL-CCNC: 42 U/L (ref 30–110)
ANION GAP SERPL CALCULATED.3IONS-SCNC: 15 MMOL/L
AST SERPL-CCNC: 80 U/L (ref 14–36)
BASOPHILS NFR BLD AUTO: 0 % (ref 0–3)
BILIRUB UR QL STRIP.AUTO: (no result)
BUN SERPL-MCNC: 5 MG/DL (ref 7–17)
BUN/CREAT SERPL: 6
CALCIUM SERPL-MCNC: 8.5 MG/DL (ref 8.4–10.2)
CHLORIDE SERPL-SCNC: 101 MMOL/L (ref 95–108)
CLARITY UR: (no result)
CO2 SERPL-SCNC: 27 MMOL/L (ref 22–30)
COLOR UR AUTO: YELLOW
CREAT SERPL-MCNC: 0.8 MG/DL (ref 0.5–1)
EOSINOPHIL NFR BLD AUTO: 1 % (ref 0–8)
ERYTHROCYTE [DISTWIDTH] IN BLOOD BY AUTOMATED COUNT: 14.6 % (ref 11.5–15.5)
GLUCOSE SERPL-MCNC: 109 MG/DL (ref 65–105)
GLUCOSE UR STRIP.AUTO-MCNC: NEGATIVE MG/DL
HCT VFR BLD AUTO: 33.8 % (ref 37–47)
HGB BLD-MCNC: 11.3 G/DL (ref 12–16)
HGB UR QL STRIP.AUTO: (no result)
IMM GRANULOCYTES NFR BLD: 0.3 % (ref 0–1)
INR PPP: 1.4 RATIO (ref 0.7–1.3)
KETONES UR STRIP.AUTO-MCNC: (no result) MG/DL
LEUKOCYTE ESTERASE UR QL STRIP.AUTO: (no result)
LIPASE SERPL-CCNC: 116 U/L (ref 23–300)
LYMPHOCYTES NFR BLD: 47 % (ref 15–41)
MCH RBC QN AUTO: 29 PG  CALC (ref 26–32)
MCHC RBC AUTO-ENTMCNC: 33.4 G/L CALC (ref 32–36)
MCV RBC AUTO: 86.9 FL  CALC (ref 80–100)
MONOCYTES NFR BLD AUTO: 8 % (ref 2–13)
NEUTROPHILS # BLD AUTO: 3.04 THOU/UL (ref 2–7.15)
NEUTROPHILS NFR BLD AUTO: 43 % (ref 42–76)
NITRITE UR QL STRIP.AUTO: NEGATIVE
PH UR STRIP.AUTO: 5.5 [PH] (ref 4.5–8)
PLATELET # BLD AUTO: 126 THOU/UL (ref 130–400)
POTASSIUM SERPL-SCNC: 3.3 MMOL/L (ref 3.5–5.1)
PROT SERPL-MCNC: 7.5 G/DL (ref 6.3–8.2)
PROT UR QL STRIP.AUTO: NEGATIVE MG/DL
PROTHROMBIN TIME: 15.4 SECONDS (ref 9–12.5)
RBC # BLD AUTO: 3.89 MILL/UL (ref 4.2–5.6)
SODIUM SERPL-SCNC: 139 MMOL/L (ref 137–146)
SP GR UR STRIP.AUTO: 1.02
SQUAMOUS URNS QL MICRO: (no result) EPI/HPF
UROBILINOGEN UR QL STRIP.AUTO: 0.2 E.U./DL

## 2017-12-11 ENCOUNTER — HOSPITAL ENCOUNTER (OUTPATIENT)
Dept: HOSPITAL 82 - ED | Age: 52
Setting detail: OBSERVATION
LOS: 2 days | Discharge: HOME | DRG: 433 | End: 2017-12-13
Attending: INTERNAL MEDICINE | Admitting: INTERNAL MEDICINE
Payer: COMMERCIAL

## 2017-12-11 VITALS — SYSTOLIC BLOOD PRESSURE: 130 MMHG | DIASTOLIC BLOOD PRESSURE: 81 MMHG

## 2017-12-11 VITALS — BODY MASS INDEX: 29.61 KG/M2 | WEIGHT: 167.12 LBS | HEIGHT: 63 IN

## 2017-12-11 DIAGNOSIS — K70.11: ICD-10-CM

## 2017-12-11 DIAGNOSIS — Z85.3: ICD-10-CM

## 2017-12-11 DIAGNOSIS — F31.9: ICD-10-CM

## 2017-12-11 DIAGNOSIS — K70.31: Primary | ICD-10-CM

## 2017-12-11 DIAGNOSIS — M79.7: ICD-10-CM

## 2017-12-11 DIAGNOSIS — E78.5: ICD-10-CM

## 2017-12-11 DIAGNOSIS — I10: ICD-10-CM

## 2017-12-11 DIAGNOSIS — J44.9: ICD-10-CM

## 2017-12-11 DIAGNOSIS — K76.6: ICD-10-CM

## 2017-12-11 DIAGNOSIS — F10.20: ICD-10-CM

## 2017-12-11 DIAGNOSIS — K27.9: ICD-10-CM

## 2017-12-11 DIAGNOSIS — K21.9: ICD-10-CM

## 2017-12-11 DIAGNOSIS — M19.90: ICD-10-CM

## 2017-12-11 DIAGNOSIS — F41.1: ICD-10-CM

## 2017-12-11 DIAGNOSIS — F11.20: ICD-10-CM

## 2017-12-11 DIAGNOSIS — E03.9: ICD-10-CM

## 2017-12-11 LAB
ALBUMIN SERPL-MCNC: 3.2 G/DL (ref 3.2–5)
ALP SERPL-CCNC: 136 U/L (ref 38–126)
ALT SERPL-CCNC: 19 U/L (ref 9–52)
ANION GAP SERPL CALCULATED.3IONS-SCNC: 16 MMOL/L
APTT PPP: 28.2 SECONDS (ref 20–32.5)
AST SERPL-CCNC: 72 U/L (ref 14–36)
BASOPHILS NFR BLD AUTO: 1 % (ref 0–3)
BUN SERPL-MCNC: 8 MG/DL (ref 7–17)
BUN/CREAT SERPL: 11
CALCIUM SERPL-MCNC: 8.5 MG/DL (ref 8.4–10.2)
CHLORIDE SERPL-SCNC: 103 MMOL/L (ref 95–108)
CO2 SERPL-SCNC: 20 MMOL/L (ref 22–30)
CREAT SERPL-MCNC: 0.8 MG/DL (ref 0.5–1)
EOSINOPHIL NFR BLD AUTO: 0 % (ref 0–8)
ERYTHROCYTE [DISTWIDTH] IN BLOOD BY AUTOMATED COUNT: 15.7 % (ref 11.5–15.5)
GLUCOSE SERPL-MCNC: 118 MG/DL (ref 65–105)
HCT VFR BLD AUTO: 31.7 % (ref 37–47)
HGB BLD-MCNC: 10.8 G/DL (ref 12–16)
IMM GRANULOCYTES NFR BLD: 0.3 % (ref 0–1)
INR PPP: 1.4 RATIO (ref 0.7–1.3)
LYMPHOCYTES NFR BLD: 38 % (ref 15–41)
MCH RBC QN AUTO: 28.6 PG  CALC (ref 26–32)
MCHC RBC AUTO-ENTMCNC: 34.1 G/L CALC (ref 32–36)
MCV RBC AUTO: 84.1 FL  CALC (ref 80–100)
MONOCYTES NFR BLD AUTO: 10 % (ref 2–13)
MYOGLOBIN SERPL-MCNC: 38 NG/ML (ref 0–62)
NEUTROPHILS # BLD AUTO: 3.83 THOU/UL (ref 2–7.15)
NEUTROPHILS NFR BLD AUTO: 50 % (ref 42–76)
PLATELET # BLD AUTO: 129 THOU/UL (ref 130–400)
POTASSIUM SERPL-SCNC: 4.6 MMOL/L (ref 3.5–5.1)
PROT SERPL-MCNC: 8.1 G/DL (ref 6.3–8.2)
PROTHROMBIN TIME: 15.4 SECONDS (ref 9–12.5)
RBC # BLD AUTO: 3.77 MILL/UL (ref 4.2–5.6)
SODIUM SERPL-SCNC: 135 MMOL/L (ref 137–146)

## 2017-12-11 PROCEDURE — G0378 HOSPITAL OBSERVATION PER HR: HCPCS

## 2017-12-11 PROCEDURE — P9047 ALBUMIN (HUMAN), 25%, 50ML: HCPCS

## 2017-12-12 VITALS — DIASTOLIC BLOOD PRESSURE: 75 MMHG | SYSTOLIC BLOOD PRESSURE: 122 MMHG

## 2017-12-12 VITALS — DIASTOLIC BLOOD PRESSURE: 80 MMHG | SYSTOLIC BLOOD PRESSURE: 122 MMHG

## 2017-12-12 VITALS — DIASTOLIC BLOOD PRESSURE: 74 MMHG | SYSTOLIC BLOOD PRESSURE: 116 MMHG

## 2017-12-12 VITALS — DIASTOLIC BLOOD PRESSURE: 69 MMHG | SYSTOLIC BLOOD PRESSURE: 107 MMHG

## 2017-12-12 VITALS — DIASTOLIC BLOOD PRESSURE: 66 MMHG | SYSTOLIC BLOOD PRESSURE: 111 MMHG

## 2017-12-12 VITALS — DIASTOLIC BLOOD PRESSURE: 61 MMHG | SYSTOLIC BLOOD PRESSURE: 106 MMHG

## 2017-12-12 VITALS — DIASTOLIC BLOOD PRESSURE: 74 MMHG | SYSTOLIC BLOOD PRESSURE: 125 MMHG

## 2017-12-12 LAB
ALBUMIN SERPL-MCNC: 3.1 G/DL (ref 3.2–5)
ALP SERPL-CCNC: 121 U/L (ref 38–126)
ALT SERPL-CCNC: 27 U/L (ref 9–52)
ANION GAP SERPL CALCULATED.3IONS-SCNC: 15 MMOL/L
AST SERPL-CCNC: 60 U/L (ref 14–36)
BASOPHILS NFR BLD AUTO: 1 % (ref 0–3)
BUN SERPL-MCNC: 9 MG/DL (ref 7–17)
BUN/CREAT SERPL: 9
CALCIUM SERPL-MCNC: 8.7 MG/DL (ref 8.4–10.2)
CHLORIDE SERPL-SCNC: 102 MMOL/L (ref 95–108)
CO2 SERPL-SCNC: 23 MMOL/L (ref 22–30)
CREAT SERPL-MCNC: 1 MG/DL (ref 0.5–1)
EOSINOPHIL NFR BLD AUTO: 0 % (ref 0–8)
ERYTHROCYTE [DISTWIDTH] IN BLOOD BY AUTOMATED COUNT: 15.9 % (ref 11.5–15.5)
GLUCOSE SERPL-MCNC: 140 MG/DL (ref 65–105)
HCT VFR BLD AUTO: 30 % (ref 37–47)
HGB BLD-MCNC: 10.3 G/DL (ref 12–16)
IMM GRANULOCYTES NFR BLD: 0.5 % (ref 0–1)
INR PPP: 1.4 RATIO (ref 0.7–1.3)
LYMPHOCYTES NFR BLD: 31 % (ref 15–41)
MCH RBC QN AUTO: 29.2 PG  CALC (ref 26–32)
MCHC RBC AUTO-ENTMCNC: 34.3 G/L CALC (ref 32–36)
MCV RBC AUTO: 85 FL  CALC (ref 80–100)
MONOCYTES NFR BLD AUTO: 12 % (ref 2–13)
NEUTROPHILS # BLD AUTO: 5.75 THOU/UL (ref 2–7.15)
NEUTROPHILS NFR BLD AUTO: 56 % (ref 42–76)
PLATELET # BLD AUTO: 159 THOU/UL (ref 130–400)
POTASSIUM SERPL-SCNC: 3.8 MMOL/L (ref 3.5–5.1)
PROT SERPL-MCNC: 7.7 G/DL (ref 6.3–8.2)
PROTHROMBIN TIME: 15.9 SECONDS (ref 9–12.5)
RBC # BLD AUTO: 3.53 MILL/UL (ref 4.2–5.6)
SODIUM SERPL-SCNC: 135 MMOL/L (ref 137–146)

## 2017-12-12 PROCEDURE — 0W9G3ZZ DRAINAGE OF PERITONEAL CAVITY, PERCUTANEOUS APPROACH: ICD-10-PCS | Performed by: RADIOLOGY

## 2017-12-13 VITALS — DIASTOLIC BLOOD PRESSURE: 62 MMHG | SYSTOLIC BLOOD PRESSURE: 118 MMHG

## 2017-12-13 VITALS — SYSTOLIC BLOOD PRESSURE: 111 MMHG | DIASTOLIC BLOOD PRESSURE: 63 MMHG

## 2017-12-13 VITALS — DIASTOLIC BLOOD PRESSURE: 70 MMHG | SYSTOLIC BLOOD PRESSURE: 111 MMHG

## 2017-12-13 LAB
ALBUMIN SERPL-MCNC: 3 G/DL (ref 3.2–5)
ALP SERPL-CCNC: 101 U/L (ref 38–126)
ALT SERPL-CCNC: 25 U/L (ref 9–52)
ANION GAP SERPL CALCULATED.3IONS-SCNC: 14 MMOL/L
AST SERPL-CCNC: 47 U/L (ref 14–36)
BASOPHILS NFR BLD AUTO: 1 % (ref 0–3)
BUN SERPL-MCNC: 8 MG/DL (ref 7–17)
BUN/CREAT SERPL: 8
CALCIUM SERPL-MCNC: 8.5 MG/DL (ref 8.4–10.2)
CHLORIDE SERPL-SCNC: 103 MMOL/L (ref 95–108)
CO2 SERPL-SCNC: 24 MMOL/L (ref 22–30)
CREAT SERPL-MCNC: 1 MG/DL (ref 0.5–1)
EOSINOPHIL NFR BLD AUTO: 1 % (ref 0–8)
ERYTHROCYTE [DISTWIDTH] IN BLOOD BY AUTOMATED COUNT: 15.7 % (ref 11.5–15.5)
GLUCOSE SERPL-MCNC: 102 MG/DL (ref 65–105)
HCT VFR BLD AUTO: 30.7 % (ref 37–47)
HGB BLD-MCNC: 10.5 G/DL (ref 12–16)
IMM GRANULOCYTES NFR BLD: 0.4 % (ref 0–1)
LYMPHOCYTES NFR BLD: 47 % (ref 15–41)
MCH RBC QN AUTO: 29.2 PG  CALC (ref 26–32)
MCHC RBC AUTO-ENTMCNC: 34.2 G/L CALC (ref 32–36)
MCV RBC AUTO: 85.5 FL  CALC (ref 80–100)
MONOCYTES NFR BLD AUTO: 11 % (ref 2–13)
NEUTROPHILS # BLD AUTO: 3.31 THOU/UL (ref 2–7.15)
NEUTROPHILS NFR BLD AUTO: 41 % (ref 42–76)
PLATELET # BLD AUTO: 125 THOU/UL (ref 130–400)
POTASSIUM SERPL-SCNC: 4.4 MMOL/L (ref 3.5–5.1)
PROT SERPL-MCNC: 7.1 G/DL (ref 6.3–8.2)
RBC # BLD AUTO: 3.59 MILL/UL (ref 4.2–5.6)
SODIUM SERPL-SCNC: 137 MMOL/L (ref 137–146)

## 2018-01-01 ENCOUNTER — HOSPITAL ENCOUNTER (INPATIENT)
Dept: HOSPITAL 82 - ED | Age: 53
LOS: 1 days | Discharge: NURSING, REHAB OR LONG-TERM CARE | DRG: 432 | End: 2018-01-02
Attending: INTERNAL MEDICINE | Admitting: INTERNAL MEDICINE
Payer: COMMERCIAL

## 2018-01-01 VITALS — SYSTOLIC BLOOD PRESSURE: 123 MMHG | DIASTOLIC BLOOD PRESSURE: 67 MMHG

## 2018-01-01 VITALS — BODY MASS INDEX: 29.69 KG/M2 | HEIGHT: 63 IN | WEIGHT: 167.55 LBS

## 2018-01-01 VITALS — DIASTOLIC BLOOD PRESSURE: 77 MMHG | SYSTOLIC BLOOD PRESSURE: 130 MMHG

## 2018-01-01 DIAGNOSIS — M19.90: ICD-10-CM

## 2018-01-01 DIAGNOSIS — K21.9: ICD-10-CM

## 2018-01-01 DIAGNOSIS — E03.9: ICD-10-CM

## 2018-01-01 DIAGNOSIS — K70.31: Primary | ICD-10-CM

## 2018-01-01 DIAGNOSIS — I85.11: ICD-10-CM

## 2018-01-01 DIAGNOSIS — F41.1: ICD-10-CM

## 2018-01-01 DIAGNOSIS — K29.70: ICD-10-CM

## 2018-01-01 DIAGNOSIS — K27.9: ICD-10-CM

## 2018-01-01 DIAGNOSIS — G62.9: ICD-10-CM

## 2018-01-01 DIAGNOSIS — Z92.21: ICD-10-CM

## 2018-01-01 DIAGNOSIS — I10: ICD-10-CM

## 2018-01-01 DIAGNOSIS — Z92.3: ICD-10-CM

## 2018-01-01 DIAGNOSIS — J44.9: ICD-10-CM

## 2018-01-01 DIAGNOSIS — E78.5: ICD-10-CM

## 2018-01-01 DIAGNOSIS — F31.9: ICD-10-CM

## 2018-01-01 DIAGNOSIS — M79.7: ICD-10-CM

## 2018-01-01 DIAGNOSIS — F10.10: ICD-10-CM

## 2018-01-01 DIAGNOSIS — Z85.3: ICD-10-CM

## 2018-01-01 LAB
ALBUMIN SERPL-MCNC: 3.1 G/DL (ref 3.2–5)
ALP SERPL-CCNC: 123 U/L (ref 38–126)
ALT SERPL-CCNC: 30 U/L (ref 9–52)
ANION GAP SERPL CALCULATED.3IONS-SCNC: 14 MMOL/L
APTT PPP: 31 SECONDS (ref 20–32.5)
AST SERPL-CCNC: 59 U/L (ref 14–36)
BASOPHILS NFR BLD AUTO: 1 % (ref 0–3)
BUN SERPL-MCNC: 16 MG/DL (ref 7–17)
BUN/CREAT SERPL: 14
CALCIUM SERPL-MCNC: 8.9 MG/DL (ref 8.4–10.2)
CHLORIDE SERPL-SCNC: 108 MMOL/L (ref 95–108)
CO2 SERPL-SCNC: 20 MMOL/L (ref 22–30)
CREAT SERPL-MCNC: 1.2 MG/DL (ref 0.5–1)
EOSINOPHIL NFR BLD AUTO: 1 % (ref 0–8)
ERYTHROCYTE [DISTWIDTH] IN BLOOD BY AUTOMATED COUNT: 21.2 % (ref 11.5–15.5)
GLUCOSE SERPL-MCNC: 104 MG/DL (ref 65–105)
HCT VFR BLD AUTO: 27.6 % (ref 37–47)
HGB BLD-MCNC: 9.3 G/DL (ref 12–16)
IMM GRANULOCYTES NFR BLD: 0.4 % (ref 0–1)
INR PPP: 1.3 RATIO (ref 0.7–1.3)
LYMPHOCYTES NFR BLD: 32 % (ref 15–41)
MCH RBC QN AUTO: 29.9 PG  CALC (ref 26–32)
MCHC RBC AUTO-ENTMCNC: 33.7 G/L CALC (ref 32–36)
MCV RBC AUTO: 88.7 FL  CALC (ref 80–100)
MONOCYTES NFR BLD AUTO: 8 % (ref 2–13)
NEUTROPHILS # BLD AUTO: 6.37 THOU/UL (ref 2–7.15)
NEUTROPHILS NFR BLD AUTO: 58 % (ref 42–76)
PLATELET # BLD AUTO: 132 THOU/UL (ref 130–400)
POTASSIUM SERPL-SCNC: 4 MMOL/L (ref 3.5–5.1)
PROT SERPL-MCNC: 7.7 G/DL (ref 6.3–8.2)
PROTHROMBIN TIME: 14.7 SECONDS (ref 9–12.5)
RBC # BLD AUTO: 3.11 MILL/UL (ref 4.2–5.6)
SODIUM SERPL-SCNC: 138 MMOL/L (ref 137–146)

## 2018-01-01 PROCEDURE — G0378 HOSPITAL OBSERVATION PER HR: HCPCS

## 2018-01-01 PROCEDURE — S0164 INJECTION PANTROPRAZOLE: HCPCS

## 2018-01-01 NOTE — NUR
Admission Note
 
 
Report Given to:         SIOBHAN BROWN
Transported by:           Wheelchair          X Stretcher
 
Transported with:        X Nurse     Transporter   X Patent IV    O2
                         X Cardiac Monitor

## 2018-01-01 NOTE — NUR
PT REQUESTING PAIN MEDICATION AT THIS TIME. PT AWARE OF NEED TO OBTAIN IV
ACCESS AND LAB RESULTS. MD AWARE OF PT STATUS. PT AWARE OF BUSY ED AND WAIT
TIME. CALL BELL WITHIN REACH.

## 2018-01-01 NOTE — NUR
MD AT [facility], IV ACCESS OBTAINED. PT AWARE OF PENDING LAB RESULTS AND AWAITING
ORDERS FOR PAIN MEDICATION. VSS WILL CONTINUE TO MONITOR.

## 2018-01-01 NOTE — NUR
REPORT RECEIVED FROM NIDHI PT ARRIVED ON UNIT VIA STRETCHER WITH ED STAFF
@ 1824, ALERT AND ORIENTED X 3, C/O ACHING PAIN TO RIGHT  LOWER ABDOMEN AND
RIGHT BACK, ABD VERY DISTENDED AND FIRM, REPORTED HAD BM THIS AM. ORIENTED TO
ROOM AND CALL BOSCH. REPORTED SHE IS WAS SUPPOSED TO SANDY TAPPED ON THURSDAY 4TH
BUT IS SO UNCOMFORTABLE AT THIS TIME WITH DISTENTION SHE DECIDED TO COME IN.
WILL CONTINUE TO MONITOR, CALL BELL IN REACH.

## 2018-01-01 NOTE — NUR
PT REPORTS ABD AND BACK PAIN AND NAUSEA. ABD DISTENDED AND FIRM TO THE TOUCH.
BS ACTIVE AND ABD NON TENDER UPON PALPATION. PT DRY HEAVING. MD AWARE OF PT
STATUS. AWAITING ORDERS.

## 2018-01-01 NOTE — NUR
MD AWARE OF UNABLE TO OBTAIN IV ACCESS OR LABS. AWAITING NEW ORDERS. PT REPORTS
CONTINUED 10/10 PAIN TO THE ABD. CALL BOSCH WITHIN REACH, WILL CONTINUE TO
MONITOR.

## 2018-01-01 NOTE — NUR
OOB TO BATHROOM WITH UNSTEADY GAIT USING CANE AND THIS WRITTER AT PT'S SIDE,
SCANT AMOUNT OF URINE VOIDED INTO COMMODE THEN BACK TO BED.  MEDICATED WITH
DILADID 1MG IV FOR C/O ABDOMINAL PAIN 8/10, CALL LIGHT IN REACH, WILL CONTINUE
TO MONITOR.

## 2018-01-02 VITALS — SYSTOLIC BLOOD PRESSURE: 88 MMHG | DIASTOLIC BLOOD PRESSURE: 55 MMHG

## 2018-01-02 VITALS — DIASTOLIC BLOOD PRESSURE: 55 MMHG | SYSTOLIC BLOOD PRESSURE: 90 MMHG

## 2018-01-02 VITALS — DIASTOLIC BLOOD PRESSURE: 62 MMHG | SYSTOLIC BLOOD PRESSURE: 82 MMHG

## 2018-01-02 VITALS — DIASTOLIC BLOOD PRESSURE: 45 MMHG | SYSTOLIC BLOOD PRESSURE: 98 MMHG

## 2018-01-02 VITALS — SYSTOLIC BLOOD PRESSURE: 85 MMHG | DIASTOLIC BLOOD PRESSURE: 53 MMHG

## 2018-01-02 VITALS — SYSTOLIC BLOOD PRESSURE: 119 MMHG | DIASTOLIC BLOOD PRESSURE: 85 MMHG

## 2018-01-02 VITALS — DIASTOLIC BLOOD PRESSURE: 52 MMHG | SYSTOLIC BLOOD PRESSURE: 81 MMHG

## 2018-01-02 VITALS — SYSTOLIC BLOOD PRESSURE: 79 MMHG | DIASTOLIC BLOOD PRESSURE: 44 MMHG

## 2018-01-02 VITALS — DIASTOLIC BLOOD PRESSURE: 62 MMHG | SYSTOLIC BLOOD PRESSURE: 87 MMHG

## 2018-01-02 VITALS — DIASTOLIC BLOOD PRESSURE: 52 MMHG | SYSTOLIC BLOOD PRESSURE: 87 MMHG

## 2018-01-02 VITALS — DIASTOLIC BLOOD PRESSURE: 65 MMHG | SYSTOLIC BLOOD PRESSURE: 83 MMHG

## 2018-01-02 VITALS — SYSTOLIC BLOOD PRESSURE: 102 MMHG | DIASTOLIC BLOOD PRESSURE: 64 MMHG

## 2018-01-02 VITALS — DIASTOLIC BLOOD PRESSURE: 72 MMHG | SYSTOLIC BLOOD PRESSURE: 113 MMHG

## 2018-01-02 LAB
ALBUMIN SERPL-MCNC: 3.1 G/DL (ref 3.2–5)
ALP SERPL-CCNC: 127 U/L (ref 38–126)
ALT SERPL-CCNC: 25 U/L (ref 9–52)
ANION GAP SERPL CALCULATED.3IONS-SCNC: 17 MMOL/L
AST SERPL-CCNC: 55 U/L (ref 14–36)
BASOPHILS NFR BLD AUTO: 0 % (ref 0–3)
BASOPHILS NFR BLD AUTO: 1 % (ref 0–3)
BUN SERPL-MCNC: 18 MG/DL (ref 7–17)
BUN/CREAT SERPL: 11
CALCIUM SERPL-MCNC: 8.9 MG/DL (ref 8.4–10.2)
CHLORIDE SERPL-SCNC: 110 MMOL/L (ref 95–108)
CO2 SERPL-SCNC: 16 MMOL/L (ref 22–30)
CREAT SERPL-MCNC: 1.6 MG/DL (ref 0.5–1)
EOSINOPHIL NFR BLD AUTO: 0 % (ref 0–8)
EOSINOPHIL NFR BLD AUTO: 1 % (ref 0–8)
ERYTHROCYTE [DISTWIDTH] IN BLOOD BY AUTOMATED COUNT: 21.3 % (ref 11.5–15.5)
ERYTHROCYTE [DISTWIDTH] IN BLOOD BY AUTOMATED COUNT: 21.5 % (ref 11.5–15.5)
GLUCOSE SERPL-MCNC: 129 MG/DL (ref 65–105)
HCT VFR BLD AUTO: 22.6 % (ref 37–47)
HCT VFR BLD AUTO: 27.2 % (ref 37–47)
HGB BLD-MCNC: 7.4 G/DL (ref 12–16)
HGB BLD-MCNC: 9.1 G/DL (ref 12–16)
IMM GRANULOCYTES NFR BLD: 0.4 % (ref 0–1)
IMM GRANULOCYTES NFR BLD: 0.4 % (ref 0–1)
INR PPP: 1.3 RATIO (ref 0.7–1.3)
LYMPHOCYTES NFR BLD: 12 % (ref 15–41)
LYMPHOCYTES NFR BLD: 38 % (ref 15–41)
MCH RBC QN AUTO: 30.1 PG  CALC (ref 26–32)
MCH RBC QN AUTO: 30.7 PG  CALC (ref 26–32)
MCHC RBC AUTO-ENTMCNC: 32.7 G/L CALC (ref 32–36)
MCHC RBC AUTO-ENTMCNC: 33.5 G/L CALC (ref 32–36)
MCV RBC AUTO: 91.9 FL  CALC (ref 80–100)
MCV RBC AUTO: 91.9 FL  CALC (ref 80–100)
MONOCYTES NFR BLD AUTO: 10 % (ref 2–13)
MONOCYTES NFR BLD AUTO: 9 % (ref 2–13)
NEUTROPHILS # BLD AUTO: 12.29 THOU/UL (ref 2–7.15)
NEUTROPHILS # BLD AUTO: 7.67 THOU/UL (ref 2–7.15)
NEUTROPHILS NFR BLD AUTO: 50 % (ref 42–76)
NEUTROPHILS NFR BLD AUTO: 78 % (ref 42–76)
PLATELET # BLD AUTO: 163 THOU/UL (ref 130–400)
PLATELET # BLD AUTO: 164 THOU/UL (ref 130–400)
POTASSIUM SERPL-SCNC: 4.1 MMOL/L (ref 3.5–5.1)
PROT SERPL-MCNC: 7.6 G/DL (ref 6.3–8.2)
PROTHROMBIN TIME: 14.4 SECONDS (ref 9–12.5)
RBC # BLD AUTO: 2.46 MILL/UL (ref 4.2–5.6)
RBC # BLD AUTO: 2.96 MILL/UL (ref 4.2–5.6)
SODIUM SERPL-SCNC: 140 MMOL/L (ref 137–146)

## 2018-01-02 NOTE — NUR
[facility] CALLED TO CHECK ON ETA SPOKE TO SUSAN AND WAS GIVEN AN ETA OF 10 TO
15 MINS
 
CALL PLACED TO ITALO AT [facility] TO GIVE HER THE ETA

## 2018-01-02 NOTE — NUR
PT ARRIVED TO THE UNIT VIA BED, PT A&O X3, PERRL, PT ORIENTED TO THE ROOM,
MONITORING EQUIPMENT, AND CALL BOSCH SYSTEM PRIOR TO APPLYING MONITORING
EQUIPMENT, , RESP. 16, BP 82/62, O2 97 ON RA, LUNGS SOUNDS CLEAR IN CALL
RINCON, BILAT +1 LEG EDEMA, WEAK PEDAL PULSES, STRONG RADIAL PULSES,
HYPOACTIVE BOWEL SOUNDS, ABD DISTENDED AND FIRM WITH TENDERNESS IN RUQ AND RLQ
PER PT, REJ WITH NS PERSCRIBED RATE, NO REDNESS OR DRAINAGE AT SITE, NG TUBE
IN PLACE AND SECURED, LOW INTERMITTENT SUCTION WITH LANA RED BLOOD DRAINAGE,
NG FLUSHED TO ENSURE DRAINAGE, PT VOMITED 200CC OF BRIGHT LANA RED BLOOD UPON
ARRIVING TO UNIT, AM ASSESSMENT COMPLETE, SEE INTERVENTION, SAFETY MEASURES
REINFORCED, CALL BELL WITHIN REACH

## 2018-01-02 NOTE — NUR
PT VOMITING BLOOD CLOTS, NG TUBE FLUSHED WITH WATER ABLE TO RELEASE SOME BLOOD
CLOTS, C/O NAUSEA.  PT CONTINUE TO BE ALERT.  DR. DICKERSON CALLED AND NOTIIFED
OF PT'S CONDITION, NEW ORDERS TO TRANSFER TO ICU RECEIVED, TRANSFERRED VIA BED
ACCOMPANIED BY THIS WRITTER, TANIA MCCANN, AND TALIA JOHNSON.  REPORT GIVEN TO
RADHA MCCANN.  ALL BELONGINS ACCOMPANIED PT.

## 2018-01-02 NOTE — NUR
PT CALLED STAFF IN TO ROOM DUE TO COGHING UP BLOODY SPUTUM, SPUTUM IS BLOODY.
DENIES SOB OF PAIN, PT IN NO APPARENT DISTRESS.

## 2018-01-02 NOTE — NUR
OOB TO BATHROOM PER PT'S REQUEST, C/O NAUSEA WHILE SITTING ON TOILET, BASIN
PROVIDED, PT VOMITING APROX 200ML BRIGHT RED BLOOD WITH BLOOD CLOTS, PT IS A/O
X2, ASSITED BACK TO BED.  CLINICAL SUPPORT TEAM CALLED.  DR. DICKERSON CALLED AND
NOTIFIED, NEW ORDER RECEIVED FOR PROTONIX GTT AND NG TUBE TO LIS.  NG TUBE
14FR TO LEFT NARE INSERTED BY GABRIEL VALENCIA RN PT TOLERATED FAIR, PLACEMENT
CONFIRMED BY AUSCULTATION, 200ML BRIGHT RED BLOOD DRAINING INTO CANISTER.

## 2018-01-02 NOTE — NUR
Discharge instructions given. Patient verbalizes understanding of same.
Discharged in stable condition via Medical Transport to [facility].
All belongings sent with pt.

## 2018-01-11 ENCOUNTER — HOSPITAL ENCOUNTER (EMERGENCY)
Dept: HOSPITAL 82 - ED | Age: 53
Discharge: ANOTHER HOSPITAL | DRG: 948 | End: 2018-01-11
Payer: COMMERCIAL

## 2018-01-11 VITALS — BODY MASS INDEX: 31.25 KG/M2 | WEIGHT: 176.37 LBS | HEIGHT: 63 IN

## 2018-01-11 VITALS — SYSTOLIC BLOOD PRESSURE: 126 MMHG | DIASTOLIC BLOOD PRESSURE: 74 MMHG

## 2018-01-11 DIAGNOSIS — I10: ICD-10-CM

## 2018-01-11 DIAGNOSIS — K74.60: ICD-10-CM

## 2018-01-11 DIAGNOSIS — R18.8: Primary | ICD-10-CM

## 2018-01-11 DIAGNOSIS — R06.02: ICD-10-CM

## 2018-01-11 DIAGNOSIS — R10.84: ICD-10-CM

## 2018-01-11 LAB
ALBUMIN SERPL-MCNC: 2.9 G/DL (ref 3.2–5)
ALP SERPL-CCNC: 75 U/L (ref 38–126)
ALT SERPL-CCNC: 24 U/L (ref 9–52)
ANION GAP SERPL CALCULATED.3IONS-SCNC: 16 MMOL/L
AST SERPL-CCNC: 37 U/L (ref 14–36)
BASOPHILS NFR BLD AUTO: 1 % (ref 0–3)
BUN SERPL-MCNC: 6 MG/DL (ref 7–17)
BUN/CREAT SERPL: 9
CALCIUM SERPL-MCNC: 8 MG/DL (ref 8.4–10.2)
CHLORIDE SERPL-SCNC: 114 MMOL/L (ref 95–108)
CO2 SERPL-SCNC: 17 MMOL/L (ref 22–30)
CREAT SERPL-MCNC: 0.7 MG/DL (ref 0.5–1)
EOSINOPHIL NFR BLD AUTO: 2 % (ref 0–8)
ERYTHROCYTE [DISTWIDTH] IN BLOOD BY AUTOMATED COUNT: 21.1 % (ref 11.5–15.5)
GLUCOSE SERPL-MCNC: 114 MG/DL (ref 65–105)
HCT VFR BLD AUTO: 28 % (ref 37–47)
HGB BLD-MCNC: 9.4 G/DL (ref 12–16)
IMM GRANULOCYTES NFR BLD: 0.4 % (ref 0–1)
LIPASE SERPL-CCNC: 169 U/L (ref 23–300)
LYMPHOCYTES NFR BLD: 39 % (ref 15–41)
MCH RBC QN AUTO: 31.2 PG  CALC (ref 26–32)
MCHC RBC AUTO-ENTMCNC: 33.6 G/L CALC (ref 32–36)
MCV RBC AUTO: 93 FL  CALC (ref 80–100)
MONOCYTES NFR BLD AUTO: 9 % (ref 2–13)
NEUTROPHILS # BLD AUTO: 5.58 THOU/UL (ref 2–7.15)
NEUTROPHILS NFR BLD AUTO: 49 % (ref 42–76)
PLATELET # BLD AUTO: 124 THOU/UL (ref 130–400)
POTASSIUM SERPL-SCNC: 3.3 MMOL/L (ref 3.5–5.1)
PROT SERPL-MCNC: 6.4 G/DL (ref 6.3–8.2)
RBC # BLD AUTO: 3.01 MILL/UL (ref 4.2–5.6)
SODIUM SERPL-SCNC: 144 MMOL/L (ref 137–146)

## 2018-01-22 ENCOUNTER — HOSPITAL ENCOUNTER (INPATIENT)
Dept: HOSPITAL 82 - MS2 | Age: 53
LOS: 3 days | Discharge: HOME | DRG: 433 | End: 2018-01-25
Attending: INTERNAL MEDICINE | Admitting: INTERNAL MEDICINE
Payer: COMMERCIAL

## 2018-01-22 VITALS — WEIGHT: 147.25 LBS | HEIGHT: 63 IN | BODY MASS INDEX: 26.09 KG/M2

## 2018-01-22 VITALS — SYSTOLIC BLOOD PRESSURE: 102 MMHG | DIASTOLIC BLOOD PRESSURE: 46 MMHG

## 2018-01-22 VITALS — SYSTOLIC BLOOD PRESSURE: 111 MMHG | DIASTOLIC BLOOD PRESSURE: 67 MMHG

## 2018-01-22 VITALS — DIASTOLIC BLOOD PRESSURE: 64 MMHG | SYSTOLIC BLOOD PRESSURE: 125 MMHG

## 2018-01-22 DIAGNOSIS — F31.9: ICD-10-CM

## 2018-01-22 DIAGNOSIS — K29.70: ICD-10-CM

## 2018-01-22 DIAGNOSIS — J44.9: ICD-10-CM

## 2018-01-22 DIAGNOSIS — Z92.21: ICD-10-CM

## 2018-01-22 DIAGNOSIS — K27.9: ICD-10-CM

## 2018-01-22 DIAGNOSIS — E78.5: ICD-10-CM

## 2018-01-22 DIAGNOSIS — K64.8: ICD-10-CM

## 2018-01-22 DIAGNOSIS — G62.9: ICD-10-CM

## 2018-01-22 DIAGNOSIS — F19.20: ICD-10-CM

## 2018-01-22 DIAGNOSIS — R11.10: ICD-10-CM

## 2018-01-22 DIAGNOSIS — M79.7: ICD-10-CM

## 2018-01-22 DIAGNOSIS — I85.10: ICD-10-CM

## 2018-01-22 DIAGNOSIS — I10: ICD-10-CM

## 2018-01-22 DIAGNOSIS — M19.90: ICD-10-CM

## 2018-01-22 DIAGNOSIS — F41.9: ICD-10-CM

## 2018-01-22 DIAGNOSIS — D05.10: ICD-10-CM

## 2018-01-22 DIAGNOSIS — E03.9: ICD-10-CM

## 2018-01-22 DIAGNOSIS — K70.31: Primary | ICD-10-CM

## 2018-01-22 DIAGNOSIS — R19.7: ICD-10-CM

## 2018-01-22 DIAGNOSIS — K21.9: ICD-10-CM

## 2018-01-22 DIAGNOSIS — D64.9: ICD-10-CM

## 2018-01-22 LAB
ALBUMIN SERPL-MCNC: 3.3 G/DL (ref 3.2–5)
ALP SERPL-CCNC: 124 U/L (ref 38–126)
ALT SERPL-CCNC: 27 U/L (ref 9–52)
ANION GAP SERPL CALCULATED.3IONS-SCNC: 19 MMOL/L
APTT PPP: 29.2 SECONDS (ref 20–32.5)
AST SERPL-CCNC: 45 U/L (ref 14–36)
BASOPHILS NFR BLD AUTO: 1 % (ref 0–3)
BILIRUB UR QL STRIP.AUTO: NEGATIVE
BUN SERPL-MCNC: 14 MG/DL (ref 7–17)
BUN/CREAT SERPL: 12
CALCIUM SERPL-MCNC: 8.5 MG/DL (ref 8.4–10.2)
CHLORIDE SERPL-SCNC: 102 MMOL/L (ref 95–108)
CLARITY UR: (no result)
CO2 SERPL-SCNC: 19 MMOL/L (ref 22–30)
COLOR UR AUTO: YELLOW
CREAT SERPL-MCNC: 1.2 MG/DL (ref 0.5–1)
EOSINOPHIL NFR BLD AUTO: 1 % (ref 0–8)
ERYTHROCYTE [DISTWIDTH] IN BLOOD BY AUTOMATED COUNT: 20 % (ref 11.5–15.5)
GLUCOSE SERPL-MCNC: 100 MG/DL (ref 65–105)
GLUCOSE UR STRIP.AUTO-MCNC: NEGATIVE MG/DL
HCT VFR BLD AUTO: 33 % (ref 37–47)
HGB BLD-MCNC: 11.2 G/DL (ref 12–16)
HGB UR QL STRIP.AUTO: (no result)
IMM GRANULOCYTES NFR BLD: 0.4 % (ref 0–1)
INR PPP: 1.3 RATIO (ref 0.7–1.3)
KETONES UR STRIP.AUTO-MCNC: NEGATIVE MG/DL
LEUKOCYTE ESTERASE UR QL STRIP.AUTO: (no result)
LYMPHOCYTES NFR BLD: 21 % (ref 15–41)
MCH RBC QN AUTO: 31.3 PG  CALC (ref 26–32)
MCHC RBC AUTO-ENTMCNC: 33.9 G/L CALC (ref 32–36)
MCV RBC AUTO: 92.2 FL  CALC (ref 80–100)
MONOCYTES NFR BLD AUTO: 12 % (ref 2–13)
NEUTROPHILS # BLD AUTO: 7.78 THOU/UL (ref 2–7.15)
NEUTROPHILS NFR BLD AUTO: 66 % (ref 42–76)
NITRITE UR QL STRIP.AUTO: NEGATIVE
PH UR STRIP.AUTO: 5 [PH] (ref 4.5–8)
PLATELET # BLD AUTO: 185 THOU/UL (ref 130–400)
POTASSIUM SERPL-SCNC: 3.7 MMOL/L (ref 3.5–5.1)
PROT SERPL-MCNC: 7 G/DL (ref 6.3–8.2)
PROT UR QL STRIP.AUTO: NEGATIVE MG/DL
PROTHROMBIN TIME: 14.7 SECONDS (ref 9–12.5)
RBC # BLD AUTO: 3.58 MILL/UL (ref 4.2–5.6)
SODIUM SERPL-SCNC: 136 MMOL/L (ref 137–146)
SP GR UR STRIP.AUTO: 1.02
SQUAMOUS URNS QL MICRO: (no result) EPI/HPF
UROBILINOGEN UR QL STRIP.AUTO: 0.2 E.U./DL
WBC #/AREA URNS HPF: >100 WBC/HPF (ref 0–5)

## 2018-01-22 PROCEDURE — 02HV33Z INSERTION OF INFUSION DEVICE INTO SUPERIOR VENA CAVA, PERCUTANEOUS APPROACH: ICD-10-PCS | Performed by: RADIOLOGY

## 2018-01-22 PROCEDURE — P9047 ALBUMIN (HUMAN), 25%, 50ML: HCPCS

## 2018-01-22 PROCEDURE — P9016 RBC LEUKOCYTES REDUCED: HCPCS

## 2018-01-22 PROCEDURE — B548ZZA ULTRASONOGRAPHY OF SUPERIOR VENA CAVA, GUIDANCE: ICD-10-PCS | Performed by: RADIOLOGY

## 2018-01-22 PROCEDURE — 0W9G3ZZ DRAINAGE OF PERITONEAL CAVITY, PERCUTANEOUS APPROACH: ICD-10-PCS | Performed by: RADIOLOGY

## 2018-01-22 NOTE — NUR
PT IS RELAXING IN BED, HAS SOME PAIN ON HER ABD. MEDICATION GIVEN AS ORDERED .
CONTINUE TO OSBERVE AND MONITOR.

## 2018-01-22 NOTE — NUR
ASSESSMENT IS COMPLTED: PT HAS BRUISING NOTED ON BACK AND BUTTOCKS FROM FALL
AT HOME. IV SITE IS FREE FROM REDNESS OR EDEMA. PARACENTESIS COMPLETED
DRESSING IS CDI. NO DISTRESS NOTED.

## 2018-01-22 NOTE — NUR
PT FAMILY AT BEDSIDE. PT DENIES ANY PAIN OR DISCOMFORT. RESP EVEN AND
UNLABORED. LUNGS CLEAR BILAT. NO DISTRESS NOTED. ABD DISTENDED; SOFT. BOWEL
SOUNDS PRESENT. RT ABD BANDAGE;CDI. TRACE ANKLE EDEMA NOTED; PT ENCOURAGED TO
ELEVATE. PEDAL PULSES PALPATED BILAT. PICC LEFT UPPER ARM PATENT. FREQUENT
ROUNDS MADE. SAFETY PRECAUTIONS REINFORCED. CALL LIGHT WITHIN REACH.

## 2018-01-23 VITALS — DIASTOLIC BLOOD PRESSURE: 60 MMHG | SYSTOLIC BLOOD PRESSURE: 99 MMHG

## 2018-01-23 VITALS — DIASTOLIC BLOOD PRESSURE: 71 MMHG | SYSTOLIC BLOOD PRESSURE: 107 MMHG

## 2018-01-23 VITALS — SYSTOLIC BLOOD PRESSURE: 111 MMHG | DIASTOLIC BLOOD PRESSURE: 66 MMHG

## 2018-01-23 VITALS — SYSTOLIC BLOOD PRESSURE: 106 MMHG | DIASTOLIC BLOOD PRESSURE: 59 MMHG

## 2018-01-23 LAB
ALBUMIN SERPL-MCNC: 2.8 G/DL (ref 3.2–5)
ALP SERPL-CCNC: 107 U/L (ref 38–126)
ALT SERPL-CCNC: 21 U/L (ref 9–52)
ANION GAP SERPL CALCULATED.3IONS-SCNC: 16 MMOL/L
AST SERPL-CCNC: 37 U/L (ref 14–36)
BASOPHILS NFR BLD AUTO: 1 % (ref 0–3)
BUN SERPL-MCNC: 14 MG/DL (ref 7–17)
BUN/CREAT SERPL: 13
CALCIUM SERPL-MCNC: 8.3 MG/DL (ref 8.4–10.2)
CHLORIDE SERPL-SCNC: 104 MMOL/L (ref 95–108)
CO2 SERPL-SCNC: 20 MMOL/L (ref 22–30)
CREAT SERPL-MCNC: 1.1 MG/DL (ref 0.5–1)
EOSINOPHIL NFR BLD AUTO: 1 % (ref 0–8)
ERYTHROCYTE [DISTWIDTH] IN BLOOD BY AUTOMATED COUNT: 19.9 % (ref 11.5–15.5)
GLUCOSE SERPL-MCNC: 121 MG/DL (ref 65–105)
HCT VFR BLD AUTO: 28.7 % (ref 37–47)
HGB BLD-MCNC: 9.6 G/DL (ref 12–16)
IMM GRANULOCYTES NFR BLD: 0.4 % (ref 0–1)
LYMPHOCYTES NFR BLD: 25 % (ref 15–41)
MCH RBC QN AUTO: 31.1 PG  CALC (ref 26–32)
MCHC RBC AUTO-ENTMCNC: 33.4 G/L CALC (ref 32–36)
MCV RBC AUTO: 92.9 FL  CALC (ref 80–100)
MONOCYTES NFR BLD AUTO: 12 % (ref 2–13)
NEUTROPHILS # BLD AUTO: 8.49 THOU/UL (ref 2–7.15)
NEUTROPHILS NFR BLD AUTO: 62 % (ref 42–76)
PLATELET # BLD AUTO: 166 THOU/UL (ref 130–400)
POTASSIUM SERPL-SCNC: 3.9 MMOL/L (ref 3.5–5.1)
PROT SERPL-MCNC: 6 G/DL (ref 6.3–8.2)
RBC # BLD AUTO: 3.09 MILL/UL (ref 4.2–5.6)
SODIUM SERPL-SCNC: 136 MMOL/L (ref 137–146)

## 2018-01-23 NOTE — NUR
PT WATCHING TV AND EATING SNACK. RESP EVEN AND UNLABORED. NO DISTRESS NOTED.
PT DENIES ANY PAIN OR DISCOMFORT. PICC PATENT. SAFETY PRECAUTIONS
REINFORCED. CALL LIGHT WITHIN REACH.

## 2018-01-23 NOTE — NUR
PATIENT RESTING IN BED AT THIS TIME-C/O SEVERE ABD PAIN-8/10 ON PAIN
SCALE-MEDICATED WITH DILAUDID 1MG IVP AS ORDERED FOR PAIN.  PATIENT ALSO GIVEN
XANAX 0.5MG PO FOR ANXIETY.  PATIENT WITH LARGE ABD BUT SOFT-HX OF ASCITES AND
PARACENTESIS YESTERDAY. PATIENT WITH BLE EDEMA-ENCOURAGED TO ELEVATED FEET ON
PILLOWS.  VOIDING RUDDY URINE WHEN ASSISTED TO BR.  PATIENT WITH SINGLE LUMEN
PICC TO LEFT UPPER ARM WITH IVF NS PATENT AND INFUSING AT 60CC/HR.  SAFETY
PRECAUTIONS REINFORCED. CALL LIGHT IN REACH. WILL CONT TO MONITOR.

## 2018-01-23 NOTE — NUR
PT RESTING WITH EYES CLOSED; NO S/SX OF DISTRESS NOTED; CALL BELL WITHIN
REACH; WILL CONTINUE TO MONITOR.

## 2018-01-23 NOTE — NUR
PT WATCHING TV. RESP EVEN AND UNLABORED. NO DISTRESS NOTED. PICC PATENT. PT
DENIES ANY PAIN OR DISCOMFORT. SAFETY PRECAUTIONS REINFORCED. CALL LIGHT
WITHIN REACH.

## 2018-01-23 NOTE — NUR
Talked to patient about her medical conditions and her medications. Pt.
reported that her pain was steady and at a score of 6 at this time. Pt.
said there is no side effects with ventolin inhaler or dilaudid pain
medication. Patient asked when she could take gabapentin for treatment of
neuropathic pain for her legs. she said that she have had this home medication
at dose of 300mg TID and want to continue this medication during her
admission. Patient has no other questions to the pharmacy at this time.

## 2018-01-23 NOTE — NUR
pt in high ward position; medicated for c/o abd pain 8/10; ivf infusing
well; no other complaints of voiced; call bell within reach; will continue to
monitor.

## 2018-01-24 VITALS — SYSTOLIC BLOOD PRESSURE: 117 MMHG | DIASTOLIC BLOOD PRESSURE: 75 MMHG

## 2018-01-24 VITALS — DIASTOLIC BLOOD PRESSURE: 60 MMHG | SYSTOLIC BLOOD PRESSURE: 99 MMHG

## 2018-01-24 VITALS — DIASTOLIC BLOOD PRESSURE: 78 MMHG | SYSTOLIC BLOOD PRESSURE: 112 MMHG

## 2018-01-24 VITALS — DIASTOLIC BLOOD PRESSURE: 60 MMHG | SYSTOLIC BLOOD PRESSURE: 110 MMHG

## 2018-01-24 VITALS — SYSTOLIC BLOOD PRESSURE: 126 MMHG | DIASTOLIC BLOOD PRESSURE: 59 MMHG

## 2018-01-24 VITALS — SYSTOLIC BLOOD PRESSURE: 113 MMHG | DIASTOLIC BLOOD PRESSURE: 54 MMHG

## 2018-01-24 VITALS — SYSTOLIC BLOOD PRESSURE: 115 MMHG | DIASTOLIC BLOOD PRESSURE: 70 MMHG

## 2018-01-24 VITALS — DIASTOLIC BLOOD PRESSURE: 68 MMHG | SYSTOLIC BLOOD PRESSURE: 102 MMHG

## 2018-01-24 VITALS — SYSTOLIC BLOOD PRESSURE: 100 MMHG | DIASTOLIC BLOOD PRESSURE: 64 MMHG

## 2018-01-24 VITALS — DIASTOLIC BLOOD PRESSURE: 70 MMHG | SYSTOLIC BLOOD PRESSURE: 110 MMHG

## 2018-01-24 VITALS — SYSTOLIC BLOOD PRESSURE: 114 MMHG | DIASTOLIC BLOOD PRESSURE: 54 MMHG

## 2018-01-24 LAB
ALBUMIN SERPL-MCNC: 2.7 G/DL (ref 3.2–5)
ALP SERPL-CCNC: 91 U/L (ref 38–126)
ALT SERPL-CCNC: 30 U/L (ref 9–52)
ANION GAP SERPL CALCULATED.3IONS-SCNC: 14 MMOL/L
AST SERPL-CCNC: 35 U/L (ref 14–36)
BASOPHILS NFR BLD AUTO: 1 % (ref 0–3)
BUN SERPL-MCNC: 13 MG/DL (ref 7–17)
BUN/CREAT SERPL: 12
CALCIUM SERPL-MCNC: 8.5 MG/DL (ref 8.4–10.2)
CHLORIDE SERPL-SCNC: 104 MMOL/L (ref 95–108)
CO2 SERPL-SCNC: 21 MMOL/L (ref 22–30)
CREAT SERPL-MCNC: 1.1 MG/DL (ref 0.5–1)
EOSINOPHIL NFR BLD AUTO: 2 % (ref 0–8)
ERYTHROCYTE [DISTWIDTH] IN BLOOD BY AUTOMATED COUNT: 19.7 % (ref 11.5–15.5)
GLUCOSE SERPL-MCNC: 105 MG/DL (ref 65–105)
HCT VFR BLD AUTO: 25.7 % (ref 37–47)
HGB BLD-MCNC: 8.6 G/DL (ref 12–16)
IMM GRANULOCYTES NFR BLD: 0.6 % (ref 0–1)
LYMPHOCYTES NFR BLD: 31 % (ref 15–41)
MCH RBC QN AUTO: 31.2 PG  CALC (ref 26–32)
MCHC RBC AUTO-ENTMCNC: 33.5 G/L CALC (ref 32–36)
MCV RBC AUTO: 93.1 FL  CALC (ref 80–100)
MONOCYTES NFR BLD AUTO: 10 % (ref 2–13)
NEUTROPHILS # BLD AUTO: 5.99 THOU/UL (ref 2–7.15)
NEUTROPHILS NFR BLD AUTO: 57 % (ref 42–76)
PLATELET # BLD AUTO: 113 THOU/UL (ref 130–400)
POTASSIUM SERPL-SCNC: 4.1 MMOL/L (ref 3.5–5.1)
PROT SERPL-MCNC: 5.6 G/DL (ref 6.3–8.2)
RBC # BLD AUTO: 2.76 MILL/UL (ref 4.2–5.6)
SODIUM SERPL-SCNC: 135 MMOL/L (ref 137–146)

## 2018-01-24 PROCEDURE — 30233N1 TRANSFUSION OF NONAUTOLOGOUS RED BLOOD CELLS INTO PERIPHERAL VEIN, PERCUTANEOUS APPROACH: ICD-10-PCS | Performed by: INTERNAL MEDICINE

## 2018-01-24 NOTE — NUR
PATIENT RESTING IN BED AT THIS TIME-C/O ABD/ RIGHT BACK AND LEG PAIN-8/10 ON
PAIN SCALE.  MEDICATED WITH DILAUDID 1MG IVP AS ORDERED.  CALL LIGHT IN REACH.
WILL CONT TO MONITOR.

## 2018-01-24 NOTE — NUR
BLOOD DRAWN FROM LEFT UPPER ARM PICC-GOOD BLOOD RETURN AND FLUSHED PER
PROTOCOL.  DILAUDID 1MG IVP GIVEN FOR C/O ABD/RIGHT BACK AND LEG PAIN-8/10
ON PAIN SCALE.  IVF NS PATENT AND INFUSING AT 60CC/HR VIA LEFT UPPER ARM PICC.
CALL LIGHT IN REACH. WILL CONT TO MONITOR.

## 2018-01-24 NOTE — NUR
PATIENT RESTING IN BED A THIS TIME TALKING ON THE PHONE-IVF NS PATENT AND
INFUSING VIA LEFT ARM PICC AT 60CC/HR.  SITE APPEARS HEALTHY AT THIS TIME.
CALL LIGHT IN REACH.  WILL CONT TO MONITOR.

## 2018-01-24 NOTE — NUR
ASSESSMENT IS COMPLETED: IV SITE IS FREE FROM REDNESS OR EDEMA. SKIN IS PALE
AND YELLOWISH. BREATH SOUNDS ARE CLEAR,BILATERALLY, NO C/O SOB, HR IS
REG,PULSES ARE STRONG. CONTINUE TO OSBERVE AND MONITOR.

## 2018-01-24 NOTE — NUR
PATIENT RESTING IN BED-MEDICATED WITH ATIVAN 0.5MG PO PER PATIENT REQUEST.
PATIENT C/O CONSTIPATION-MEDICATED WITH MOM 30CC PO AS ORDERED.  PATIENT WITH
LARGE ABD-BANDAID TO RIGHT ABD PARACENTESIS SITE CHANGED FOR SMALL AMT OF
SEROUS DRAINAGE.  SAFETY PRECAUTIONS REINFORSED.  CALL LIGHT IN REACH. WILL
CONT TO MONITOR.

## 2018-01-24 NOTE — NUR
PATIENT MEDICATED FOR C/O 8/10 ABD PAIN WITH DILAIDID 1MG IVP.  FEET ARE
SWOLLEN AND ELEVATED ON PILLOWS.  CALL LIGHT IN REACH. WILL CONT TO MONITOR.

## 2018-01-24 NOTE — NUR
Pt was seen for mededucation rounds. Pt is noticeably shaking. She states that
she has eaten today and she had a cup of coffee earlier. Pt explains that she
typically doesn't eat much, usually twice a day with breakfast being the
larger meal. Pt's albuterol inhaler is scheduled QID and pt reports that
normally she uses this med PRN. Pt also had questions regarding an interaction
of gabapentin with prilosec. The mechanism of action of prilosec was
elucidated and the pt was advised to take prilosec first thing in the morning
an hour before food. Pt also noted that she's normally on furosemide. She also
explained that she was "backed up this morning" and ended up resolving it with
a "hottie tottie" made of orange and prune juice. While speaking to pt, pt had
a cramp occur in her toe. Pt had no further questions or concerns.

## 2018-01-24 NOTE — NUR
PT IS RELAXING IN BED VISITING WITH FAMILY AND FRIENDS. NO DISTRESS NOTED. IV
SITE IS FREE FROM REDNESS OR EDEMA. HAS BRUISING NOTED. ON  BILATERAL ARMS.

## 2018-01-24 NOTE — NUR
PATIENT APPEARS SLEEPING AT THIS TIME-IVF PATENT AND INFUSING AT 60CC/HR VIA
LEFT UPPER ARM PICC.  CALL LIGHT IN REACH. WILL CONT TO MONITOR.

## 2018-01-25 VITALS — SYSTOLIC BLOOD PRESSURE: 113 MMHG | DIASTOLIC BLOOD PRESSURE: 66 MMHG

## 2018-01-25 VITALS — SYSTOLIC BLOOD PRESSURE: 101 MMHG | DIASTOLIC BLOOD PRESSURE: 62 MMHG

## 2018-01-25 LAB
ANION GAP SERPL CALCULATED.3IONS-SCNC: 14 MMOL/L
BASOPHILS NFR BLD AUTO: 1 % (ref 0–3)
BUN SERPL-MCNC: 13 MG/DL (ref 7–17)
BUN/CREAT SERPL: 14
CALCIUM SERPL-MCNC: 8.7 MG/DL (ref 8.4–10.2)
CHLORIDE SERPL-SCNC: 107 MMOL/L (ref 95–108)
CO2 SERPL-SCNC: 21 MMOL/L (ref 22–30)
CREAT SERPL-MCNC: 0.9 MG/DL (ref 0.5–1)
EOSINOPHIL NFR BLD AUTO: 3 % (ref 0–8)
ERYTHROCYTE [DISTWIDTH] IN BLOOD BY AUTOMATED COUNT: 18.8 % (ref 11.5–15.5)
GLUCOSE SERPL-MCNC: 84 MG/DL (ref 65–105)
HCT VFR BLD AUTO: 32.5 % (ref 37–47)
HGB BLD-MCNC: 11.1 G/DL (ref 12–16)
IMM GRANULOCYTES NFR BLD: 0.3 % (ref 0–1)
LYMPHOCYTES NFR BLD: 35 % (ref 15–41)
MCH RBC QN AUTO: 31 PG  CALC (ref 26–32)
MCHC RBC AUTO-ENTMCNC: 34.2 G/L CALC (ref 32–36)
MCV RBC AUTO: 90.8 FL  CALC (ref 80–100)
MONOCYTES NFR BLD AUTO: 10 % (ref 2–13)
NEUTROPHILS # BLD AUTO: 4.69 THOU/UL (ref 2–7.15)
NEUTROPHILS NFR BLD AUTO: 52 % (ref 42–76)
PLATELET # BLD AUTO: 97 THOU/UL (ref 130–400)
POTASSIUM SERPL-SCNC: 4.7 MMOL/L (ref 3.5–5.1)
RBC # BLD AUTO: 3.58 MILL/UL (ref 4.2–5.6)
SODIUM SERPL-SCNC: 136 MMOL/L (ref 137–146)

## 2018-01-25 NOTE — NUR
PATIENT APPEARS TO BE SLEEPING WITH EYES CLOSED.  RESP EVEN AND UNLABORED.
CALL LIGHT IN REACH.  WILL CONT TO MONITOR.

## 2018-01-25 NOTE — NUR
PATIENT RESTING IN BED.  LAB WORK DRAWN FROM LEFT UPPER ARM PICC WITHOUT ANY
DIFFICULTY-GOOD BLOOD RETURN. LEFT UPPER ARM WITH BRUISING AT PICC LINE
INSERTION SITE.  SOME WELLING AS WELL.  DENIES ANY PAIN AT THE SITE.  PATIENT
MEDICATED FOR 8/10 ABD PAIN WITH DILAUDID 1MG IVP AS ORDERED FOR PAIN.
BANDAID TO RIGHT ABD CHANGED AGAIN FOR SEROUS FLUID. ASSISTED PATIENT TO
BR-INSTRUCTED TO CALL FOR ASSIST WHEN DONE.

## 2018-01-25 NOTE — NUR
IV SITE DISCONTINUED CATHETER INTACT, MEASURES 35CM. COVERED WITH DRESSING AND
COBAN. DISCHARGE INSTRUCTIONS GIVEN AND VERBALIZED UNDERSTANDING.

## 2018-01-25 NOTE — NUR
ASSESSMENT IS COMPLETED: IV SITE IS FREE FROM REDNESS OR EDEMA. DRESSING ON R
ABD HAS SOME SERO DRAINAGE NOTED. CONTINUE TO OSEBRVE AND MONITOR.

## 2018-01-27 ENCOUNTER — HOSPITAL ENCOUNTER (INPATIENT)
Dept: HOSPITAL 82 - ED | Age: 53
LOS: 4 days | Discharge: HOSPICE | DRG: 434 | End: 2018-01-31
Attending: INTERNAL MEDICINE | Admitting: INTERNAL MEDICINE
Payer: COMMERCIAL

## 2018-01-27 VITALS — HEIGHT: 63 IN | WEIGHT: 160.06 LBS | BODY MASS INDEX: 28.36 KG/M2

## 2018-01-27 DIAGNOSIS — K21.9: ICD-10-CM

## 2018-01-27 DIAGNOSIS — E87.5: ICD-10-CM

## 2018-01-27 DIAGNOSIS — K59.00: ICD-10-CM

## 2018-01-27 DIAGNOSIS — G62.9: ICD-10-CM

## 2018-01-27 DIAGNOSIS — K70.40: ICD-10-CM

## 2018-01-27 DIAGNOSIS — Z66: ICD-10-CM

## 2018-01-27 DIAGNOSIS — Z85.3: ICD-10-CM

## 2018-01-27 DIAGNOSIS — M19.90: ICD-10-CM

## 2018-01-27 DIAGNOSIS — K27.9: ICD-10-CM

## 2018-01-27 DIAGNOSIS — K70.31: Primary | ICD-10-CM

## 2018-01-27 DIAGNOSIS — K64.8: ICD-10-CM

## 2018-01-27 DIAGNOSIS — F10.21: ICD-10-CM

## 2018-01-27 DIAGNOSIS — E87.6: ICD-10-CM

## 2018-01-27 DIAGNOSIS — F31.9: ICD-10-CM

## 2018-01-27 DIAGNOSIS — D64.9: ICD-10-CM

## 2018-01-27 DIAGNOSIS — E03.9: ICD-10-CM

## 2018-01-27 DIAGNOSIS — Z51.5: ICD-10-CM

## 2018-01-27 DIAGNOSIS — E78.5: ICD-10-CM

## 2018-01-27 DIAGNOSIS — M79.7: ICD-10-CM

## 2018-01-27 DIAGNOSIS — F41.9: ICD-10-CM

## 2018-01-27 DIAGNOSIS — K29.70: ICD-10-CM

## 2018-01-27 DIAGNOSIS — J44.9: ICD-10-CM

## 2018-01-27 DIAGNOSIS — I10: ICD-10-CM

## 2018-01-27 LAB
ALBUMIN SERPL-MCNC: 2.9 G/DL (ref 3.2–5)
ALP SERPL-CCNC: 118 U/L (ref 38–126)
ALT SERPL-CCNC: 32 U/L (ref 9–52)
AMYLASE SERPL-CCNC: 63 U/L (ref 30–110)
ANION GAP SERPL CALCULATED.3IONS-SCNC: 16 MMOL/L
AST SERPL-CCNC: 39 U/L (ref 14–36)
BASOPHILS NFR BLD AUTO: 1 % (ref 0–3)
BUN SERPL-MCNC: 21 MG/DL (ref 7–17)
BUN/CREAT SERPL: 13
CHLORIDE SERPL-SCNC: 109 MMOL/L (ref 95–108)
CO2 SERPL-SCNC: 18 MMOL/L (ref 22–30)
CREAT SERPL-MCNC: 1.7 MG/DL (ref 0.5–1)
EOSINOPHIL NFR BLD AUTO: 3 % (ref 0–8)
ERYTHROCYTE [DISTWIDTH] IN BLOOD BY AUTOMATED COUNT: 19.1 % (ref 11.5–15.5)
HCT VFR BLD AUTO: 32.4 % (ref 37–47)
HGB BLD-MCNC: 10.7 G/DL (ref 12–16)
IMM GRANULOCYTES NFR BLD: 0.2 % (ref 0–1)
LIPASE SERPL-CCNC: 189 U/L (ref 23–300)
LYMPHOCYTES NFR BLD: 37 % (ref 15–41)
MCH RBC QN AUTO: 30.7 PG  CALC (ref 26–32)
MCHC RBC AUTO-ENTMCNC: 33 G/L CALC (ref 32–36)
MCV RBC AUTO: 92.8 FL  CALC (ref 80–100)
MONOCYTES NFR BLD AUTO: 11 % (ref 2–13)
NEUTROPHILS # BLD AUTO: 4.46 THOU/UL (ref 2–7.15)
NEUTROPHILS NFR BLD AUTO: 49 % (ref 42–76)
PLATELET # BLD AUTO: 116 THOU/UL (ref 130–400)
POTASSIUM SERPL-SCNC: 5.6 MMOL/L (ref 3.5–5.1)
PROT SERPL-MCNC: 6.2 G/DL (ref 6.3–8.2)
RBC # BLD AUTO: 3.49 MILL/UL (ref 4.2–5.6)
SODIUM SERPL-SCNC: 137 MMOL/L (ref 137–146)

## 2018-01-28 VITALS — SYSTOLIC BLOOD PRESSURE: 110 MMHG | DIASTOLIC BLOOD PRESSURE: 65 MMHG

## 2018-01-28 VITALS — DIASTOLIC BLOOD PRESSURE: 64 MMHG | SYSTOLIC BLOOD PRESSURE: 107 MMHG

## 2018-01-28 VITALS — SYSTOLIC BLOOD PRESSURE: 95 MMHG | DIASTOLIC BLOOD PRESSURE: 55 MMHG

## 2018-01-28 VITALS — SYSTOLIC BLOOD PRESSURE: 86 MMHG | DIASTOLIC BLOOD PRESSURE: 58 MMHG

## 2018-01-28 VITALS — SYSTOLIC BLOOD PRESSURE: 108 MMHG | DIASTOLIC BLOOD PRESSURE: 69 MMHG

## 2018-01-28 VITALS — SYSTOLIC BLOOD PRESSURE: 122 MMHG | DIASTOLIC BLOOD PRESSURE: 64 MMHG

## 2018-01-28 VITALS — SYSTOLIC BLOOD PRESSURE: 102 MMHG | DIASTOLIC BLOOD PRESSURE: 63 MMHG

## 2018-01-28 LAB
ALBUMIN SERPL-MCNC: 3.2 G/DL (ref 3.2–5)
ALP SERPL-CCNC: 122 U/L (ref 38–126)
ALT SERPL-CCNC: 14 U/L (ref 9–52)
ANION GAP SERPL CALCULATED.3IONS-SCNC: 15 MMOL/L
ANION GAP SERPL CALCULATED.3IONS-SCNC: 17 MMOL/L
AST SERPL-CCNC: 55 U/L (ref 14–36)
BACTERIA #/AREA URNS HPF: (no result) HPF
BASOPHILS NFR BLD AUTO: 0 % (ref 0–3)
BILIRUB UR QL STRIP.AUTO: (no result)
BUN SERPL-MCNC: 20 MG/DL (ref 7–17)
BUN SERPL-MCNC: 21 MG/DL (ref 7–17)
BUN/CREAT SERPL: 13
BUN/CREAT SERPL: 13
CHLORIDE SERPL-SCNC: 110 MMOL/L (ref 95–108)
CHLORIDE SERPL-SCNC: 112 MMOL/L (ref 95–108)
CLARITY UR: CLEAR
CO2 SERPL-SCNC: 15 MMOL/L (ref 22–30)
CO2 SERPL-SCNC: 19 MMOL/L (ref 22–30)
COLOR UR AUTO: YELLOW
CREAT SERPL-MCNC: 1.6 MG/DL (ref 0.5–1)
CREAT SERPL-MCNC: 1.6 MG/DL (ref 0.5–1)
EOSINOPHIL NFR BLD AUTO: 1 % (ref 0–8)
EPI CELLS URNS QL MICRO: (no result) EPI/HPF
ERYTHROCYTE [DISTWIDTH] IN BLOOD BY AUTOMATED COUNT: 19.3 % (ref 11.5–15.5)
FINE GRAN CASTS URNS QL MICRO: (no result) LPF
GLUCOSE UR STRIP.AUTO-MCNC: NEGATIVE MG/DL
HCT VFR BLD AUTO: 34.7 % (ref 37–47)
HGB BLD-MCNC: 11.3 G/DL (ref 12–16)
HGB UR QL STRIP.AUTO: NEGATIVE
HYALINE CASTS URNS QL MICRO: (no result) LPF
IMM GRANULOCYTES NFR BLD: 0.2 % (ref 0–1)
KETONES UR STRIP.AUTO-MCNC: (no result) MG/DL
LEUKOCYTE ESTERASE UR QL STRIP.AUTO: (no result)
LYMPHOCYTES NFR BLD: 18 % (ref 15–41)
MCH RBC QN AUTO: 30.6 PG  CALC (ref 26–32)
MCHC RBC AUTO-ENTMCNC: 32.6 G/L CALC (ref 32–36)
MCV RBC AUTO: 94 FL  CALC (ref 80–100)
MONOCYTES NFR BLD AUTO: 4 % (ref 2–13)
NEUTROPHILS # BLD AUTO: 7.35 THOU/UL (ref 2–7.15)
NEUTROPHILS NFR BLD AUTO: 76 % (ref 42–76)
NITRITE UR QL STRIP.AUTO: NEGATIVE
PH UR STRIP.AUTO: 5 [PH] (ref 4.5–8)
PLATELET # BLD AUTO: 97 THOU/UL (ref 130–400)
POTASSIUM SERPL-SCNC: 5.3 MMOL/L (ref 3.5–5.1)
POTASSIUM SERPL-SCNC: 6.2 MMOL/L (ref 3.5–5.1)
PROT SERPL-MCNC: 6.6 G/DL (ref 6.3–8.2)
PROT UR QL STRIP.AUTO: NEGATIVE MG/DL
RBC # BLD AUTO: 3.69 MILL/UL (ref 4.2–5.6)
RBC #/AREA URNS HPF: (no result) RBC/HPF (ref 0–5)
SODIUM SERPL-SCNC: 138 MMOL/L (ref 137–146)
SODIUM SERPL-SCNC: 139 MMOL/L (ref 137–146)
SP GR UR STRIP.AUTO: 1.02
UROBILINOGEN UR QL STRIP.AUTO: 0.2 E.U./DL
WBC #/AREA URNS HPF: (no result) WBC/HPF (ref 0–5)

## 2018-01-29 VITALS — DIASTOLIC BLOOD PRESSURE: 55 MMHG | SYSTOLIC BLOOD PRESSURE: 98 MMHG

## 2018-01-29 VITALS — DIASTOLIC BLOOD PRESSURE: 58 MMHG | SYSTOLIC BLOOD PRESSURE: 89 MMHG

## 2018-01-29 VITALS — DIASTOLIC BLOOD PRESSURE: 41 MMHG | SYSTOLIC BLOOD PRESSURE: 84 MMHG

## 2018-01-29 VITALS — DIASTOLIC BLOOD PRESSURE: 60 MMHG | SYSTOLIC BLOOD PRESSURE: 90 MMHG

## 2018-01-29 VITALS — SYSTOLIC BLOOD PRESSURE: 76 MMHG | DIASTOLIC BLOOD PRESSURE: 50 MMHG

## 2018-01-29 VITALS — SYSTOLIC BLOOD PRESSURE: 115 MMHG | DIASTOLIC BLOOD PRESSURE: 68 MMHG

## 2018-01-29 VITALS — DIASTOLIC BLOOD PRESSURE: 50 MMHG | SYSTOLIC BLOOD PRESSURE: 88 MMHG

## 2018-01-29 LAB
ALBUMIN SERPL-MCNC: 2.6 G/DL (ref 3.2–5)
ALP SERPL-CCNC: 82 U/L (ref 38–126)
ALT SERPL-CCNC: 23 U/L (ref 9–52)
ANION GAP SERPL CALCULATED.3IONS-SCNC: 18 MMOL/L
AST SERPL-CCNC: 37 U/L (ref 14–36)
BUN SERPL-MCNC: 22 MG/DL (ref 7–17)
BUN/CREAT SERPL: 12
CHLORIDE SERPL-SCNC: 113 MMOL/L (ref 95–108)
CO2 SERPL-SCNC: 16 MMOL/L (ref 22–30)
CREAT SERPL-MCNC: 2 MG/DL (ref 0.5–1)
ERYTHROCYTE [DISTWIDTH] IN BLOOD BY AUTOMATED COUNT: 19.7 % (ref 11.5–15.5)
HCT VFR BLD AUTO: 31.9 % (ref 37–47)
HGB BLD-MCNC: 10.5 G/DL (ref 12–16)
IMM GRANULOCYTES NFR BLD: 0.3 % (ref 0–1)
LYMPHOCYTES NFR BLD MANUAL: 20 % (ref 15–41)
MANUAL DIFF BLD: YES
MCH RBC QN AUTO: 31.3 PG  CALC (ref 26–32)
MCHC RBC AUTO-ENTMCNC: 32.9 G/L CALC (ref 32–36)
MCV RBC AUTO: 95.2 FL  CALC (ref 80–100)
MONOCYTES NFR BLD MANUAL: 6 % (ref 2–13)
NEUTS BAND NFR BLD MANUAL: 31 % (ref 0–8)
NEUTS SEG NFR BLD MANUAL: 43 % (ref 42–76)
PLATELET # BLD AUTO: 104 THOU/UL (ref 130–400)
PLATELET BLD QL SMEAR: (no result)
POTASSIUM SERPL-SCNC: 3.3 MMOL/L (ref 3.5–5.1)
PROT SERPL-MCNC: 5.7 G/DL (ref 6.3–8.2)
RBC # BLD AUTO: 3.35 MILL/UL (ref 4.2–5.6)
SODIUM SERPL-SCNC: 144 MMOL/L (ref 137–146)

## 2018-01-30 VITALS — SYSTOLIC BLOOD PRESSURE: 107 MMHG | DIASTOLIC BLOOD PRESSURE: 65 MMHG

## 2018-01-30 VITALS — DIASTOLIC BLOOD PRESSURE: 57 MMHG | SYSTOLIC BLOOD PRESSURE: 97 MMHG

## 2018-01-30 VITALS — DIASTOLIC BLOOD PRESSURE: 49 MMHG | SYSTOLIC BLOOD PRESSURE: 92 MMHG

## 2018-01-30 VITALS — DIASTOLIC BLOOD PRESSURE: 65 MMHG | SYSTOLIC BLOOD PRESSURE: 105 MMHG

## 2018-01-30 VITALS — SYSTOLIC BLOOD PRESSURE: 103 MMHG | DIASTOLIC BLOOD PRESSURE: 61 MMHG

## 2018-01-30 LAB
ANION GAP SERPL CALCULATED.3IONS-SCNC: 14 MMOL/L
APTT PPP: 32.4 SECONDS (ref 20–32.5)
BUN SERPL-MCNC: 26 MG/DL (ref 7–17)
BUN/CREAT SERPL: 14
CHLORIDE SERPL-SCNC: 115 MMOL/L (ref 95–108)
CO2 SERPL-SCNC: 19 MMOL/L (ref 22–30)
CREAT SERPL-MCNC: 1.9 MG/DL (ref 0.5–1)
EOSINOPHIL NFR BLD MANUAL: 1 % (ref 0–8)
ERYTHROCYTE [DISTWIDTH] IN BLOOD BY AUTOMATED COUNT: 19.8 % (ref 11.5–15.5)
HCT VFR BLD AUTO: 29.1 % (ref 37–47)
HGB BLD-MCNC: 9.7 G/DL (ref 12–16)
IMM GRANULOCYTES NFR BLD: 0.4 % (ref 0–1)
INR PPP: 1.2 RATIO (ref 0.7–1.3)
LYMPHOCYTES NFR BLD MANUAL: 22 % (ref 15–41)
MANUAL DIFF BLD: YES
MCH RBC QN AUTO: 31.1 PG  CALC (ref 26–32)
MCHC RBC AUTO-ENTMCNC: 33.3 G/L CALC (ref 32–36)
MCV RBC AUTO: 93.3 FL  CALC (ref 80–100)
MONOCYTES NFR BLD MANUAL: 5 % (ref 2–13)
NEUTS BAND NFR BLD MANUAL: 1 % (ref 0–8)
NEUTS SEG NFR BLD MANUAL: 68 % (ref 42–76)
PLATELET # BLD AUTO: 95 THOU/UL (ref 130–400)
PLATELET BLD QL SMEAR: (no result)
POTASSIUM SERPL-SCNC: 2.8 MMOL/L (ref 3.5–5.1)
PROTHROMBIN TIME: 13.8 SECONDS (ref 9–12.5)
RBC # BLD AUTO: 3.12 MILL/UL (ref 4.2–5.6)
SODIUM SERPL-SCNC: 146 MMOL/L (ref 137–146)

## 2018-01-30 PROCEDURE — 0W9G3ZZ DRAINAGE OF PERITONEAL CAVITY, PERCUTANEOUS APPROACH: ICD-10-PCS | Performed by: RADIOLOGY

## 2018-01-30 PROCEDURE — 02HV33Z INSERTION OF INFUSION DEVICE INTO SUPERIOR VENA CAVA, PERCUTANEOUS APPROACH: ICD-10-PCS | Performed by: RADIOLOGY

## 2018-01-30 PROCEDURE — B518ZZA FLUOROSCOPY OF SUPERIOR VENA CAVA, GUIDANCE: ICD-10-PCS | Performed by: RADIOLOGY

## 2018-01-31 ENCOUNTER — HOSPITAL ENCOUNTER (INPATIENT)
Dept: HOSPITAL 82 - MS2 | Age: 53
LOS: 1 days | Discharge: HOSPICE | DRG: 951 | End: 2018-02-01
Attending: INTERNAL MEDICINE | Admitting: INTERNAL MEDICINE
Payer: COMMERCIAL

## 2018-01-31 VITALS — SYSTOLIC BLOOD PRESSURE: 127 MMHG | DIASTOLIC BLOOD PRESSURE: 79 MMHG

## 2018-01-31 VITALS — SYSTOLIC BLOOD PRESSURE: 100 MMHG | DIASTOLIC BLOOD PRESSURE: 65 MMHG

## 2018-01-31 VITALS — SYSTOLIC BLOOD PRESSURE: 111 MMHG | DIASTOLIC BLOOD PRESSURE: 76 MMHG

## 2018-01-31 VITALS — DIASTOLIC BLOOD PRESSURE: 62 MMHG | SYSTOLIC BLOOD PRESSURE: 95 MMHG

## 2018-01-31 VITALS — DIASTOLIC BLOOD PRESSURE: 65 MMHG | SYSTOLIC BLOOD PRESSURE: 102 MMHG

## 2018-01-31 VITALS — WEIGHT: 160.06 LBS | HEIGHT: 63 IN | BODY MASS INDEX: 28.36 KG/M2

## 2018-01-31 DIAGNOSIS — J44.9: ICD-10-CM

## 2018-01-31 DIAGNOSIS — Z66: ICD-10-CM

## 2018-01-31 DIAGNOSIS — K70.40: ICD-10-CM

## 2018-01-31 DIAGNOSIS — K70.31: ICD-10-CM

## 2018-01-31 DIAGNOSIS — F31.9: ICD-10-CM

## 2018-01-31 DIAGNOSIS — K21.9: ICD-10-CM

## 2018-01-31 DIAGNOSIS — I10: ICD-10-CM

## 2018-01-31 DIAGNOSIS — Z51.5: Primary | ICD-10-CM

## 2018-01-31 DIAGNOSIS — G62.9: ICD-10-CM

## 2018-01-31 DIAGNOSIS — K27.9: ICD-10-CM

## 2018-01-31 DIAGNOSIS — F43.10: ICD-10-CM

## 2018-01-31 DIAGNOSIS — F41.9: ICD-10-CM

## 2018-01-31 LAB
ALBUMIN SERPL-MCNC: 2.5 G/DL (ref 3.2–5)
ALP SERPL-CCNC: 115 U/L (ref 38–126)
ALT SERPL-CCNC: 30 U/L (ref 9–52)
ANION GAP SERPL CALCULATED.3IONS-SCNC: 14 MMOL/L
AST SERPL-CCNC: 54 U/L (ref 14–36)
BASOPHILS NFR BLD AUTO: 0 % (ref 0–3)
BUN SERPL-MCNC: 22 MG/DL (ref 7–17)
BUN/CREAT SERPL: 19
CHLORIDE SERPL-SCNC: 117 MMOL/L (ref 95–108)
CO2 SERPL-SCNC: 19 MMOL/L (ref 22–30)
CREAT SERPL-MCNC: 1.2 MG/DL (ref 0.5–1)
EOSINOPHIL NFR BLD AUTO: 1 % (ref 0–8)
ERYTHROCYTE [DISTWIDTH] IN BLOOD BY AUTOMATED COUNT: 19.8 % (ref 11.5–15.5)
HCT VFR BLD AUTO: 30.4 % (ref 37–47)
HGB BLD-MCNC: 10.2 G/DL (ref 12–16)
IMM GRANULOCYTES NFR BLD: 0.5 % (ref 0–1)
LYMPHOCYTES NFR BLD: 29 % (ref 15–41)
MCH RBC QN AUTO: 31.5 PG  CALC (ref 26–32)
MCHC RBC AUTO-ENTMCNC: 33.6 G/L CALC (ref 32–36)
MCV RBC AUTO: 93.8 FL  CALC (ref 80–100)
MONOCYTES NFR BLD AUTO: 8 % (ref 2–13)
NEUTROPHILS # BLD AUTO: 8.63 THOU/UL (ref 2–7.15)
NEUTROPHILS NFR BLD AUTO: 62 % (ref 42–76)
PLATELET # BLD AUTO: 99 THOU/UL (ref 130–400)
POTASSIUM SERPL-SCNC: 3.8 MMOL/L (ref 3.5–5.1)
PROT SERPL-MCNC: 5.7 G/DL (ref 6.3–8.2)
RBC # BLD AUTO: 3.24 MILL/UL (ref 4.2–5.6)
SODIUM SERPL-SCNC: 146 MMOL/L (ref 137–146)

## 2018-01-31 PROCEDURE — 0T9B70Z DRAINAGE OF BLADDER WITH DRAINAGE DEVICE, VIA NATURAL OR ARTIFICIAL OPENING: ICD-10-PCS | Performed by: INTERNAL MEDICINE

## 2018-01-31 NOTE — NUR
RECEIVED CHANGE OF SHIFT REPORT FROM GRAEME BANKS. PATIENT LYING IN BED AND
VOICED NO COMPLAINTS. NO APPARENT ACUTE DISTRESS NOTED. VISITOR AT BEDSIDE.
WILL CONTINUE TO MONITOR.

## 2018-01-31 NOTE — NUR
RECEIVED CHANGE OF SHIFT REPORT FROM SIOBHAN BROWN. PATIENT SITTING UP IN BED AND
HAVING SUPPER. NO APPARENT ACUTE DISTRESS OR DISCOMFORT NOTED AT THIS TIME.
WILL CONTINUE TO MONITOR.

## 2018-02-01 VITALS — DIASTOLIC BLOOD PRESSURE: 69 MMHG | SYSTOLIC BLOOD PRESSURE: 111 MMHG

## 2018-02-01 VITALS — DIASTOLIC BLOOD PRESSURE: 50 MMHG | SYSTOLIC BLOOD PRESSURE: 122 MMHG

## 2018-02-01 NOTE — NUR
PT INCONTINENT OF LARGE AMOUNT OF SOFT BROWN STOOL; PT GIVEN COMPLETE BED BATH
AND REPOSITIONED; PT YELLING OUT IN PAIN WITH ANY TOUCH AND MOVEMENT, PT
MEDICATED AS ORDERED; MODERATE AMOUNT OF SEROUS DRAINAGE NOTED FROM OLD
PUNCTURE SITE RT ABDOMEN; COREAS DRAINING RUDDY URINE TO BEDSIDE DRAIN;
MULITPLE SCABS AND BRUISING NOTED TO BILATE UE AND LE; COCCYX REDDEN WITHOUT
ANY OPEN AREA NOTED; BED ALARM IN PLACE FOR SAFETY; CALL BELL WITHIN REACH;
WILL CONTINUE TO MONITOR.

## 2018-02-01 NOTE — NUR
PATIENT STILL MOANING, AGAIN OFFERED TO MEDICATE PATIENT FOR PAIN CONTROL.
PATIENT AGAIN REFUSED IN THE [id number] OF [place] - .

## 2018-02-01 NOTE — NUR
BHARGAV JOHNOSN, ASKED PATIENT IN [facility]'S (lpn) PRESENCE IF SHE'S HURTING AND
WANTED ANYTHING FOR PAIN. PATIENT AGAIN STATED "NO., I AM FINE"

## 2018-02-01 NOTE — NUR
PT TIFFANY, STATES PAIN 9/10; MEDICATED AS ORDERED; CNA IN TO ASSIST WITH
LUNCH; WILL CONTINUE TO MONITOR.

## 2018-02-01 NOTE — NUR
PATIENT WAKES UP AND MOANING. WENT TO PATIENT ASSISTANCE AND OFFERED TO
MEDICATED HER FOR PAIN. PATIENT REFUSED AND STATED "I AM FINE"  ASKED PATIENT
REPEATEDLY IF SHE WANTS ANYTHING FOR PAIN. PATIENT STATED "NO, I AM FINE"

## 2021-07-28 NOTE — NUR
PT MEDICATED WITH 15 MG OF TORADOL AND 4 MG OF ZOFRAN IVP FOR 10/10 PAIN TO THE
ABD. This writer met with the patient to discuss the admission process, rights and plan of care. Patient agreed to start voluntary behavioral health treatment in the ED and was provided a copy of all documents signed.  A copy was scanned to EMR and originals wer
